# Patient Record
Sex: FEMALE | Race: BLACK OR AFRICAN AMERICAN | NOT HISPANIC OR LATINO
[De-identification: names, ages, dates, MRNs, and addresses within clinical notes are randomized per-mention and may not be internally consistent; named-entity substitution may affect disease eponyms.]

---

## 2017-03-21 ENCOUNTER — MEDICATION RENEWAL (OUTPATIENT)
Age: 47
End: 2017-03-21

## 2017-03-31 ENCOUNTER — RX RENEWAL (OUTPATIENT)
Age: 47
End: 2017-03-31

## 2017-04-21 ENCOUNTER — APPOINTMENT (OUTPATIENT)
Dept: RHEUMATOLOGY | Facility: CLINIC | Age: 47
End: 2017-04-21

## 2017-04-21 ENCOUNTER — LABORATORY RESULT (OUTPATIENT)
Age: 47
End: 2017-04-21

## 2017-04-21 VITALS
WEIGHT: 191 LBS | DIASTOLIC BLOOD PRESSURE: 86 MMHG | OXYGEN SATURATION: 98 % | HEIGHT: 62 IN | HEART RATE: 78 BPM | SYSTOLIC BLOOD PRESSURE: 129 MMHG | BODY MASS INDEX: 35.15 KG/M2

## 2017-04-23 LAB
25(OH)D3 SERPL-MCNC: 10.7 NG/ML
ALBUMIN SERPL ELPH-MCNC: 3.5 G/DL
ALP BLD-CCNC: 86 U/L
ALT SERPL-CCNC: 20 U/L
ANION GAP SERPL CALC-SCNC: 16 MMOL/L
APPEARANCE: CLEAR
AST SERPL-CCNC: 22 U/L
BACTERIA: NEGATIVE
BASOPHILS # BLD AUTO: 0.06 K/UL
BASOPHILS NFR BLD AUTO: 0.9 %
BILIRUB SERPL-MCNC: 0.2 MG/DL
BILIRUBIN URINE: NEGATIVE
BLOOD URINE: NEGATIVE
BUN SERPL-MCNC: 11 MG/DL
C3 SERPL-MCNC: 151 MG/DL
C4 SERPL-MCNC: 28 MG/DL
CALCIUM SERPL-MCNC: 8.6 MG/DL
CCP AB SER IA-ACNC: <8 UNITS
CHLORIDE SERPL-SCNC: 104 MMOL/L
CK SERPL-CCNC: 60 U/L
CO2 SERPL-SCNC: 20 MMOL/L
COLOR: YELLOW
CREAT SERPL-MCNC: 0.77 MG/DL
DSDNA AB SER-ACNC: <12 IU/ML
ENA RNP AB SER IA-ACNC: 0.2 AL
ENA SM AB SER IA-ACNC: <0.2 AL
ENA SS-A AB SER IA-ACNC: >8 AL
ENA SS-B AB SER IA-ACNC: 0.2 AL
EOSINOPHIL # BLD AUTO: 0.06 K/UL
EOSINOPHIL NFR BLD AUTO: 0.9 %
GLUCOSE QUALITATIVE U: NORMAL MG/DL
GLUCOSE SERPL-MCNC: 83 MG/DL
HCT VFR BLD CALC: 32.1 %
HGB BLD-MCNC: 9.7 G/DL
HIV1+2 AB SPEC QL IA.RAPID: NONREACTIVE
HYALINE CASTS: 1 /LPF
KETONES URINE: NEGATIVE
LEUKOCYTE ESTERASE URINE: NEGATIVE
LYMPHOCYTES # BLD AUTO: 1.11 K/UL
LYMPHOCYTES NFR BLD AUTO: 17.3 %
MAN DIFF?: NORMAL
MCHC RBC-ENTMCNC: 22.4 PG
MCHC RBC-ENTMCNC: 30.2 GM/DL
MCV RBC AUTO: 74 FL
MICROSCOPIC-UA: NORMAL
MONOCYTES # BLD AUTO: 0.64 K/UL
MONOCYTES NFR BLD AUTO: 10 %
NEUTROPHILS # BLD AUTO: 4.57 K/UL
NEUTROPHILS NFR BLD AUTO: 70.9 %
NITRITE URINE: NEGATIVE
PH URINE: 6.5
PLATELET # BLD AUTO: 364 K/UL
POTASSIUM SERPL-SCNC: 4.8 MMOL/L
PROT SERPL-MCNC: 7.8 G/DL
PROTEIN URINE: 100 MG/DL
RBC # BLD: 4.34 M/UL
RBC # FLD: 18.6 %
RED BLOOD CELLS URINE: 10 /HPF
RF+CCP IGG SER-IMP: NEGATIVE
RHEUMATOID FACT SER QL: >650 IU/ML
SODIUM SERPL-SCNC: 140 MMOL/L
SPECIFIC GRAVITY URINE: 1.03
SQUAMOUS EPITHELIAL CELLS: 3 /HPF
TSH SERPL-ACNC: 2.01 UIU/ML
UROBILINOGEN URINE: 1 MG/DL
WBC # FLD AUTO: 6.44 K/UL
WHITE BLOOD CELLS URINE: 2 /HPF

## 2018-01-23 ENCOUNTER — APPOINTMENT (OUTPATIENT)
Dept: RHEUMATOLOGY | Facility: CLINIC | Age: 48
End: 2018-01-23
Payer: COMMERCIAL

## 2018-01-23 ENCOUNTER — LABORATORY RESULT (OUTPATIENT)
Age: 48
End: 2018-01-23

## 2018-01-23 VITALS
HEART RATE: 80 BPM | RESPIRATION RATE: 16 BRPM | SYSTOLIC BLOOD PRESSURE: 116 MMHG | OXYGEN SATURATION: 96 % | BODY MASS INDEX: 35.15 KG/M2 | WEIGHT: 191 LBS | DIASTOLIC BLOOD PRESSURE: 79 MMHG | TEMPERATURE: 98 F | HEIGHT: 62 IN

## 2018-01-23 LAB
25(OH)D3 SERPL-MCNC: 12.3 NG/ML
ALBUMIN SERPL ELPH-MCNC: 3.7 G/DL
ALP BLD-CCNC: 83 U/L
ALT SERPL-CCNC: 23 U/L
ANION GAP SERPL CALC-SCNC: 14 MMOL/L
APPEARANCE: CLEAR
APTT BLD: 29.1 SEC
AST SERPL-CCNC: 25 U/L
BACTERIA: ABNORMAL
BILIRUB SERPL-MCNC: 0.3 MG/DL
BILIRUBIN URINE: NEGATIVE
BLOOD URINE: ABNORMAL
BUN SERPL-MCNC: 13 MG/DL
CALCIUM SERPL-MCNC: 8.8 MG/DL
CHLORIDE SERPL-SCNC: 103 MMOL/L
CK SERPL-CCNC: 87 U/L
CO2 SERPL-SCNC: 23 MMOL/L
COLOR: YELLOW
CREAT SERPL-MCNC: 0.69 MG/DL
CREAT SPEC-SCNC: 138 MG/DL
CREAT/PROT UR: 0.3 RATIO
GLUCOSE QUALITATIVE U: NEGATIVE MG/DL
GLUCOSE SERPL-MCNC: 89 MG/DL
HYALINE CASTS: 1 /LPF
INR PPP: 0.97 RATIO
IRON SATN MFR SERPL: 8 %
IRON SERPL-MCNC: 27 UG/DL
KETONES URINE: ABNORMAL
LEUKOCYTE ESTERASE URINE: NEGATIVE
MICROSCOPIC-UA: NORMAL
NITRITE URINE: NEGATIVE
PH URINE: 6
POTASSIUM SERPL-SCNC: 3.9 MMOL/L
PROT SERPL-MCNC: 8.2 G/DL
PROT UR-MCNC: 38 MG/DL
PROTEIN URINE: 30 MG/DL
PT BLD: 11 SEC
RED BLOOD CELLS URINE: 2 /HPF
SODIUM SERPL-SCNC: 140 MMOL/L
SPECIFIC GRAVITY URINE: 1.02
SQUAMOUS EPITHELIAL CELLS: 7 /HPF
TIBC SERPL-MCNC: 358 UG/DL
TSH SERPL-ACNC: 2.21 UIU/ML
UIBC SERPL-MCNC: 331 UG/DL
UROBILINOGEN URINE: NEGATIVE MG/DL
WHITE BLOOD CELLS URINE: 3 /HPF

## 2018-01-23 PROCEDURE — 99214 OFFICE O/P EST MOD 30 MIN: CPT

## 2018-01-24 LAB
B2 GLYCOPROT1 AB SER QL: POSITIVE
BASOPHILS # BLD AUTO: 0.06 K/UL
BASOPHILS NFR BLD AUTO: 0.9 %
C3 SERPL-MCNC: 141 MG/DL
C4 SERPL-MCNC: 26 MG/DL
CARDIOLIPIN AB SER IA-ACNC: NEGATIVE
DSDNA AB SER-ACNC: <12 IU/ML
ENA RNP AB SER IA-ACNC: 0.2 AL
ENA SM AB SER IA-ACNC: <0.2 AL
ENA SS-A AB SER IA-ACNC: >8 AL
ENA SS-B AB SER IA-ACNC: 0.4 AL
EOSINOPHIL # BLD AUTO: 0.32 K/UL
EOSINOPHIL NFR BLD AUTO: 4.5
HCT VFR BLD CALC: 30.8 %
HGB BLD-MCNC: 9.8 G/DL
LYMPHOCYTES # BLD AUTO: 0.89 K/UL
LYMPHOCYTES NFR BLD AUTO: 12.5 %
MAN DIFF?: NORMAL
MCHC RBC-ENTMCNC: 23.1 PG
MCHC RBC-ENTMCNC: 31.8 GM/DL
MCV RBC AUTO: 72.5 FL
MONOCYTES # BLD AUTO: 0.63 K/UL
MONOCYTES NFR BLD AUTO: 8.9 %
NEUTROPHILS # BLD AUTO: 5.08 K/UL
NEUTROPHILS NFR BLD AUTO: 71.4 %
PLATELET # BLD AUTO: 425 K/UL
RBC # BLD: 4.25 M/UL
RBC # FLD: 18.2 %
WBC # FLD AUTO: 7.11 K/UL

## 2018-01-26 LAB — B2 GLYCOPROT1 IGA SERPL IA-ACNC: <5 SAU

## 2018-02-07 ENCOUNTER — APPOINTMENT (OUTPATIENT)
Dept: OBGYN | Facility: CLINIC | Age: 48
End: 2018-02-07

## 2018-07-30 ENCOUNTER — LABORATORY RESULT (OUTPATIENT)
Age: 48
End: 2018-07-30

## 2018-07-30 ENCOUNTER — APPOINTMENT (OUTPATIENT)
Dept: RHEUMATOLOGY | Facility: CLINIC | Age: 48
End: 2018-07-30
Payer: COMMERCIAL

## 2018-07-30 VITALS
DIASTOLIC BLOOD PRESSURE: 78 MMHG | HEIGHT: 62 IN | TEMPERATURE: 98.2 F | SYSTOLIC BLOOD PRESSURE: 113 MMHG | HEART RATE: 81 BPM | WEIGHT: 189 LBS | OXYGEN SATURATION: 98 % | BODY MASS INDEX: 34.78 KG/M2

## 2018-07-30 LAB
25(OH)D3 SERPL-MCNC: 38.7 NG/ML
ALBUMIN SERPL ELPH-MCNC: 3.6 G/DL
ALP BLD-CCNC: 68 U/L
ALT SERPL-CCNC: 85 U/L
ANION GAP SERPL CALC-SCNC: 10 MMOL/L
APPEARANCE: CLEAR
AST SERPL-CCNC: 57 U/L
BACTERIA: NEGATIVE
BASOPHILS # BLD AUTO: 0.02 K/UL
BASOPHILS NFR BLD AUTO: 0.4 %
BILIRUB SERPL-MCNC: 0.3 MG/DL
BILIRUBIN URINE: NEGATIVE
BLOOD URINE: NEGATIVE
BUN SERPL-MCNC: 10 MG/DL
C3 SERPL-MCNC: 100 MG/DL
C4 SERPL-MCNC: 17 MG/DL
CALCIUM SERPL-MCNC: 8.8 MG/DL
CHLORIDE SERPL-SCNC: 104 MMOL/L
CHOLEST SERPL-MCNC: 139 MG/DL
CHOLEST/HDLC SERPL: 2.6 RATIO
CK SERPL-CCNC: 69 U/L
CO2 SERPL-SCNC: 25 MMOL/L
COLOR: YELLOW
CREAT SERPL-MCNC: 0.67 MG/DL
CREAT SPEC-SCNC: 108 MG/DL
CREAT/PROT UR: 0.1 RATIO
EOSINOPHIL # BLD AUTO: 0.1 K/UL
EOSINOPHIL NFR BLD AUTO: 1.9 %
GLUCOSE QUALITATIVE U: NEGATIVE MG/DL
GLUCOSE SERPL-MCNC: 69 MG/DL
HBA1C MFR BLD HPLC: 5.4 %
HCT VFR BLD CALC: 37 %
HDLC SERPL-MCNC: 53 MG/DL
HGB BLD-MCNC: 11.8 G/DL
HYALINE CASTS: 0 /LPF
IMM GRANULOCYTES NFR BLD AUTO: 0.2 %
IRON SATN MFR SERPL: 14 %
IRON SERPL-MCNC: 45 UG/DL
KETONES URINE: NEGATIVE
LDLC SERPL CALC-MCNC: 77 MG/DL
LEUKOCYTE ESTERASE URINE: NEGATIVE
LYMPHOCYTES # BLD AUTO: 1.52 K/UL
LYMPHOCYTES NFR BLD AUTO: 29.2 %
MAN DIFF?: NORMAL
MCHC RBC-ENTMCNC: 25.8 PG
MCHC RBC-ENTMCNC: 31.9 GM/DL
MCV RBC AUTO: 80.8 FL
MICROSCOPIC-UA: NORMAL
MONOCYTES # BLD AUTO: 0.32 K/UL
MONOCYTES NFR BLD AUTO: 6.1 %
NEUTROPHILS # BLD AUTO: 3.24 K/UL
NEUTROPHILS NFR BLD AUTO: 62.2 %
NITRITE URINE: NEGATIVE
PH URINE: 7.5
PLATELET # BLD AUTO: 294 K/UL
POTASSIUM SERPL-SCNC: 4.8 MMOL/L
PROT SERPL-MCNC: 8.3 G/DL
PROT UR-MCNC: 15 MG/DL
PROTEIN URINE: NEGATIVE MG/DL
RBC # BLD: 4.58 M/UL
RBC # FLD: 19.9 %
RED BLOOD CELLS URINE: 4 /HPF
SODIUM SERPL-SCNC: 139 MMOL/L
SPECIFIC GRAVITY URINE: 1.01
SQUAMOUS EPITHELIAL CELLS: 4 /HPF
TIBC SERPL-MCNC: 333 UG/DL
TRIGL SERPL-MCNC: 47 MG/DL
TSH SERPL-ACNC: 1.14 UIU/ML
UIBC SERPL-MCNC: 288 UG/DL
UROBILINOGEN URINE: NEGATIVE MG/DL
WBC # FLD AUTO: 5.21 K/UL
WHITE BLOOD CELLS URINE: 0 /HPF

## 2018-07-30 PROCEDURE — 99214 OFFICE O/P EST MOD 30 MIN: CPT

## 2018-08-06 LAB — SSDNA AB SER-ACNC: 45 EU

## 2019-02-12 ENCOUNTER — OUTPATIENT (OUTPATIENT)
Dept: OUTPATIENT SERVICES | Facility: HOSPITAL | Age: 49
LOS: 1 days | End: 2019-02-12
Payer: SUBSIDIZED

## 2019-02-12 DIAGNOSIS — Z00.6 ENCOUNTER FOR EXAMINATION FOR NORMAL COMPARISON AND CONTROL IN CLINICAL RESEARCH PROGRAM: ICD-10-CM

## 2019-02-12 DIAGNOSIS — M32.9 SYSTEMIC LUPUS ERYTHEMATOSUS, UNSPECIFIED: ICD-10-CM

## 2019-02-12 PROCEDURE — 93306 TTE W/DOPPLER COMPLETE: CPT | Mod: 26

## 2019-02-12 PROCEDURE — 75571 CT HRT W/O DYE W/CA TEST: CPT

## 2019-02-12 PROCEDURE — 75571 CT HRT W/O DYE W/CA TEST: CPT | Mod: 26

## 2019-02-12 PROCEDURE — 0399T: CPT

## 2019-02-12 PROCEDURE — 93306 TTE W/DOPPLER COMPLETE: CPT

## 2019-02-12 PROCEDURE — 93356 MYOCRD STRAIN IMG SPCKL TRCK: CPT

## 2019-02-19 ENCOUNTER — APPOINTMENT (OUTPATIENT)
Dept: RHEUMATOLOGY | Facility: CLINIC | Age: 49
End: 2019-02-19
Payer: COMMERCIAL

## 2019-02-19 VITALS
SYSTOLIC BLOOD PRESSURE: 122 MMHG | BODY MASS INDEX: 33.86 KG/M2 | HEIGHT: 62 IN | RESPIRATION RATE: 16 BRPM | HEART RATE: 80 BPM | OXYGEN SATURATION: 98 % | DIASTOLIC BLOOD PRESSURE: 82 MMHG | WEIGHT: 184 LBS

## 2019-02-19 DIAGNOSIS — I25.10 ATHEROSCLEROTIC HEART DISEASE OF NATIVE CORONARY ARTERY W/OUT ANGINA PECTORIS: ICD-10-CM

## 2019-02-19 DIAGNOSIS — I25.84 ATHEROSCLEROTIC HEART DISEASE OF NATIVE CORONARY ARTERY W/OUT ANGINA PECTORIS: ICD-10-CM

## 2019-02-19 PROCEDURE — 99214 OFFICE O/P EST MOD 30 MIN: CPT

## 2019-02-19 RX ORDER — IRON ASPGLY,PS/C/B12/FA/CA/SUC 150-25-1
50-100 CAPSULE ORAL
Qty: 180 | Refills: 3 | Status: ACTIVE | COMMUNITY
Start: 2019-02-19 | End: 1900-01-01

## 2019-05-16 ENCOUNTER — RX RENEWAL (OUTPATIENT)
Age: 49
End: 2019-05-16

## 2019-05-28 ENCOUNTER — EMERGENCY (EMERGENCY)
Facility: HOSPITAL | Age: 49
LOS: 1 days | Discharge: HOME | End: 2019-05-28
Attending: EMERGENCY MEDICINE
Payer: COMMERCIAL

## 2019-05-28 VITALS
SYSTOLIC BLOOD PRESSURE: 117 MMHG | DIASTOLIC BLOOD PRESSURE: 80 MMHG | TEMPERATURE: 99 F | HEART RATE: 70 BPM | RESPIRATION RATE: 17 BRPM | OXYGEN SATURATION: 98 %

## 2019-05-28 VITALS
OXYGEN SATURATION: 100 % | RESPIRATION RATE: 16 BRPM | DIASTOLIC BLOOD PRESSURE: 72 MMHG | TEMPERATURE: 99 F | WEIGHT: 195.11 LBS | SYSTOLIC BLOOD PRESSURE: 107 MMHG | HEIGHT: 62 IN | HEART RATE: 105 BPM

## 2019-05-28 LAB
ALBUMIN SERPL ELPH-MCNC: 3.3 G/DL — SIGNIFICANT CHANGE UP (ref 3.3–5)
ALP SERPL-CCNC: 62 U/L — SIGNIFICANT CHANGE UP (ref 40–120)
ALT FLD-CCNC: 28 U/L — SIGNIFICANT CHANGE UP (ref 10–45)
ANION GAP SERPL CALC-SCNC: 10 MMOL/L — SIGNIFICANT CHANGE UP (ref 5–17)
APPEARANCE UR: ABNORMAL
AST SERPL-CCNC: 36 U/L — SIGNIFICANT CHANGE UP (ref 10–40)
BACTERIA # UR AUTO: NEGATIVE — SIGNIFICANT CHANGE UP
BASOPHILS # BLD AUTO: 0.1 K/UL — SIGNIFICANT CHANGE UP (ref 0–0.2)
BASOPHILS NFR BLD AUTO: 0.3 % — SIGNIFICANT CHANGE UP (ref 0–2)
BILIRUB SERPL-MCNC: 0.7 MG/DL — SIGNIFICANT CHANGE UP (ref 0.2–1.2)
BILIRUB UR-MCNC: NEGATIVE — SIGNIFICANT CHANGE UP
BUN SERPL-MCNC: 9 MG/DL — SIGNIFICANT CHANGE UP (ref 7–23)
CALCIUM SERPL-MCNC: 8.8 MG/DL — SIGNIFICANT CHANGE UP (ref 8.4–10.5)
CHLORIDE SERPL-SCNC: 103 MMOL/L — SIGNIFICANT CHANGE UP (ref 96–108)
CO2 SERPL-SCNC: 20 MMOL/L — LOW (ref 22–31)
COLOR SPEC: YELLOW — SIGNIFICANT CHANGE UP
COMMENT - URINE: SIGNIFICANT CHANGE UP
CREAT SERPL-MCNC: 0.73 MG/DL — SIGNIFICANT CHANGE UP (ref 0.5–1.3)
DIFF PNL FLD: ABNORMAL
EOSINOPHIL # BLD AUTO: 0.1 K/UL — SIGNIFICANT CHANGE UP (ref 0–0.5)
EOSINOPHIL NFR BLD AUTO: 0.3 % — SIGNIFICANT CHANGE UP (ref 0–6)
EPI CELLS # UR: 12 — HIGH
GAS PNL BLDV: SIGNIFICANT CHANGE UP
GLUCOSE SERPL-MCNC: 117 MG/DL — HIGH (ref 70–99)
GLUCOSE UR QL: NEGATIVE — SIGNIFICANT CHANGE UP
HCG UR QL: NEGATIVE — SIGNIFICANT CHANGE UP
HCT VFR BLD CALC: 33.7 % — LOW (ref 34.5–45)
HGB BLD-MCNC: 11.3 G/DL — LOW (ref 11.5–15.5)
HYALINE CASTS # UR AUTO: 3 /LPF — SIGNIFICANT CHANGE UP (ref 0–7)
KETONES UR-MCNC: SIGNIFICANT CHANGE UP
LEUKOCYTE ESTERASE UR-ACNC: NEGATIVE — SIGNIFICANT CHANGE UP
LYMPHOCYTES # BLD AUTO: 1.5 K/UL — SIGNIFICANT CHANGE UP (ref 1–3.3)
LYMPHOCYTES # BLD AUTO: 7.3 % — LOW (ref 13–44)
MCHC RBC-ENTMCNC: 27.1 PG — SIGNIFICANT CHANGE UP (ref 27–34)
MCHC RBC-ENTMCNC: 33.4 GM/DL — SIGNIFICANT CHANGE UP (ref 32–36)
MCV RBC AUTO: 81.2 FL — SIGNIFICANT CHANGE UP (ref 80–100)
MONOCYTES # BLD AUTO: 1.4 K/UL — HIGH (ref 0–0.9)
MONOCYTES NFR BLD AUTO: 6.6 % — SIGNIFICANT CHANGE UP (ref 2–14)
NEUTROPHILS # BLD AUTO: 17.5 K/UL — HIGH (ref 1.8–7.4)
NEUTROPHILS NFR BLD AUTO: 85.4 % — HIGH (ref 43–77)
NITRITE UR-MCNC: NEGATIVE — SIGNIFICANT CHANGE UP
PH UR: 6 — SIGNIFICANT CHANGE UP (ref 5–8)
PLATELET # BLD AUTO: 214 K/UL — SIGNIFICANT CHANGE UP (ref 150–400)
POTASSIUM SERPL-MCNC: 4.4 MMOL/L — SIGNIFICANT CHANGE UP (ref 3.5–5.3)
POTASSIUM SERPL-SCNC: 4.4 MMOL/L — SIGNIFICANT CHANGE UP (ref 3.5–5.3)
PROT SERPL-MCNC: 8.3 G/DL — SIGNIFICANT CHANGE UP (ref 6–8.3)
PROT UR-MCNC: ABNORMAL
RBC # BLD: 4.15 M/UL — SIGNIFICANT CHANGE UP (ref 3.8–5.2)
RBC # FLD: 16.5 % — HIGH (ref 10.3–14.5)
RBC CASTS # UR COMP ASSIST: 6 /HPF — HIGH (ref 0–4)
SODIUM SERPL-SCNC: 133 MMOL/L — LOW (ref 135–145)
SP GR SPEC: 1.03 — HIGH (ref 1.01–1.02)
UROBILINOGEN FLD QL: NEGATIVE — SIGNIFICANT CHANGE UP
WBC # BLD: 20.5 K/UL — HIGH (ref 3.8–10.5)
WBC # FLD AUTO: 20.5 K/UL — HIGH (ref 3.8–10.5)
WBC UR QL: 15 /HPF — HIGH (ref 0–5)

## 2019-05-28 PROCEDURE — 85014 HEMATOCRIT: CPT

## 2019-05-28 PROCEDURE — 81001 URINALYSIS AUTO W/SCOPE: CPT

## 2019-05-28 PROCEDURE — 74177 CT ABD & PELVIS W/CONTRAST: CPT | Mod: 26

## 2019-05-28 PROCEDURE — 82330 ASSAY OF CALCIUM: CPT

## 2019-05-28 PROCEDURE — 85027 COMPLETE CBC AUTOMATED: CPT

## 2019-05-28 PROCEDURE — 84132 ASSAY OF SERUM POTASSIUM: CPT

## 2019-05-28 PROCEDURE — 82435 ASSAY OF BLOOD CHLORIDE: CPT

## 2019-05-28 PROCEDURE — 87086 URINE CULTURE/COLONY COUNT: CPT

## 2019-05-28 PROCEDURE — 83605 ASSAY OF LACTIC ACID: CPT

## 2019-05-28 PROCEDURE — 96374 THER/PROPH/DIAG INJ IV PUSH: CPT | Mod: XU

## 2019-05-28 PROCEDURE — 71046 X-RAY EXAM CHEST 2 VIEWS: CPT

## 2019-05-28 PROCEDURE — 71046 X-RAY EXAM CHEST 2 VIEWS: CPT | Mod: 26

## 2019-05-28 PROCEDURE — 82803 BLOOD GASES ANY COMBINATION: CPT

## 2019-05-28 PROCEDURE — 84295 ASSAY OF SERUM SODIUM: CPT

## 2019-05-28 PROCEDURE — 74177 CT ABD & PELVIS W/CONTRAST: CPT

## 2019-05-28 PROCEDURE — 99284 EMERGENCY DEPT VISIT MOD MDM: CPT

## 2019-05-28 PROCEDURE — 80053 COMPREHEN METABOLIC PANEL: CPT

## 2019-05-28 PROCEDURE — 99284 EMERGENCY DEPT VISIT MOD MDM: CPT | Mod: 25

## 2019-05-28 PROCEDURE — 81025 URINE PREGNANCY TEST: CPT

## 2019-05-28 PROCEDURE — 82947 ASSAY GLUCOSE BLOOD QUANT: CPT

## 2019-05-28 RX ORDER — KETOROLAC TROMETHAMINE 30 MG/ML
15 SYRINGE (ML) INJECTION ONCE
Refills: 0 | Status: DISCONTINUED | OUTPATIENT
Start: 2019-05-28 | End: 2019-05-28

## 2019-05-28 RX ORDER — ACETAMINOPHEN 500 MG
975 TABLET ORAL ONCE
Refills: 0 | Status: COMPLETED | OUTPATIENT
Start: 2019-05-28 | End: 2019-05-28

## 2019-05-28 RX ORDER — SODIUM CHLORIDE 9 MG/ML
1000 INJECTION INTRAMUSCULAR; INTRAVENOUS; SUBCUTANEOUS ONCE
Refills: 0 | Status: COMPLETED | OUTPATIENT
Start: 2019-05-28 | End: 2019-05-28

## 2019-05-28 RX ADMIN — SODIUM CHLORIDE 1000 MILLILITER(S): 9 INJECTION INTRAMUSCULAR; INTRAVENOUS; SUBCUTANEOUS at 16:15

## 2019-05-28 RX ADMIN — Medication 975 MILLIGRAM(S): at 16:17

## 2019-05-28 RX ADMIN — Medication 15 MILLIGRAM(S): at 16:17

## 2019-05-28 NOTE — ED PROVIDER NOTE - ATTENDING CONTRIBUTION TO CARE
Pt with lupus nephritis presents with L flank to LLQ ab pain with fever to 100.8F since yesterday, and cough.  Mild td L CVA and LLQ, clear lungs.

## 2019-05-28 NOTE — ED PROVIDER NOTE - OBJECTIVE STATEMENT
49 y/o F with PMHx of SLE (not medications), fibroids, and HTN on lisinopril, No PSHx presents c/o abdominal pain since yesterday. Pain is 6/10, constant, worsened slightly since yesterday, cramping, L flank and LUQ radiates to LLQ, a/w constipation. Pt is still passing stool and flatus but notes stool is smaller caliber and harder than usual x 2-3 days, pt takes iron supplements, denies melena hematochezia, or preceding diarrhea. LMP 3 weeks ago, pt notes spotting this week and is due for her menses at the end of the week, denies abnormal vaginal bleeding or DC. Pt denies CP, SOB, back pain, pleuritic pain, n/v, urinary sxs, strange/abnl foods, recent travel, recent abx use, h/o pyelo or kidney stones, trauma, recent illness, prior episodes. Works as a .

## 2019-05-28 NOTE — ED PROVIDER NOTE - CLINICAL SUMMARY MEDICAL DECISION MAKING FREE TEXT BOX
Dr. Mao Note: LLQ and L flank with cough, consider atypical pneumonia vs pyelonephritis vs diverticulitis

## 2019-05-28 NOTE — ED ADULT NURSE NOTE - OBJECTIVE STATEMENT
48 year old female A&OX4 PMHX of Fibroids, presents with left sided flank pain that  radiates to the left abdomen that started yesterday. Patient states that flank pain is associated with subjective fever of 101.0. +CVA tenderness and left sided abdominal pain to palpation on exam. Lung sounds are clear and equal bilaterally. Abdomen is soft but tender over left upper and lower quadrants. Denies nausea, vomiting, dizziness, weakness, chest pain, shortness of breath, chest pain, palpitations, hematuria, burning or difficulty urinating.

## 2019-05-28 NOTE — ED PROVIDER NOTE - PHYSICAL EXAMINATION
GEN: Pt in NAD, A&O x3.  PSYCH: Affect appropriate.  EYES: Sclera white w/o injection, PERRLA.   ENT: Head NCAT. Nose w/o deformity. No auricular TTP. MMM. Neck supple FROM.   RESP: No chest wall tenderness, CTA b/l, no wheezes, rales, or rhonchi.   CARDIAC: RRR, clear distinct S1, S2, no appreciable murmurs.  ABD: Abdomen soft, ttp LUQ, rebound or guarding. Mild L sided CVAT.  VASC: 2+ radial and dorsalis pedis pulses b/l. No edema or calf tenderness.  SKIN: No rashes on the trunk.  Adequate mobility and turgor.

## 2019-05-28 NOTE — ED PROVIDER NOTE - NSFOLLOWUPINSTRUCTIONS_ED_ALL_ED_FT
1) Follow-up with your primary care provider in 1-2 days.    2) Continue to take all medications as prescribed.    3) Rest and drink plenty of fluids. Pain can be managed with Acetaminophen (aka Tylenol) 1000mg (2 extra strength tablets) every 6 hours and/or ibuprofen (aka Motrin or Advil) 600mg (3 regular strength tablets) every 8 hours.    4) Return to the ER for any new or worsening symptoms, abnormal vaginal bleeding, discharge, new or worsening abdominal pain, vomiting, fever, not passing stool or gas, or if any other concerns.

## 2019-05-28 NOTE — ED PROVIDER NOTE - CARE PLAN
Principal Discharge DX:	Acute abdominal pain  Assessment and plan of treatment:	1) Follow-up with your primary care provider in 1-2 days.  2) Continue to take all medications as prescribed.  3) Rest and drink plenty of fluids. Pain can be managed with Acetaminophen (aka Tylenol) 1000mg (2 extra strength tablets) every 6 hours and/or ibuprofen (aka Motrin or Advil) 600mg (3 regular strength tablets) every 8 hours.  4) Return to the ER for any new or worsening symptoms, abnormal vaginal bleeding, discharge, new or worsening abdominal pain, vomiting, fever, not passing stool or gas, or if any other concerns.

## 2019-05-28 NOTE — ED PROVIDER NOTE - PROGRESS NOTE DETAILS
CTAP negative, pt feeling better. labs, images, and plan d/w pt. all questions answered. return precautions explained. pt ready and stable for DC. -Harjit New PA-C

## 2019-05-28 NOTE — ED ADULT NURSE NOTE - CAS EDN DISCHARGE INTERVENTIONS
IV discontinued, cath removed intact/Pt d/c home w/ written and verbal instructions. Pt verbalized understanding. IV d/c. No s&s of infection/infiltration. VSS. Pt states " I am ready to go home".

## 2019-05-29 LAB
CULTURE RESULTS: SIGNIFICANT CHANGE UP
SPECIMEN SOURCE: SIGNIFICANT CHANGE UP

## 2019-06-03 ENCOUNTER — APPOINTMENT (OUTPATIENT)
Dept: RHEUMATOLOGY | Facility: CLINIC | Age: 49
End: 2019-06-03
Payer: COMMERCIAL

## 2019-06-03 ENCOUNTER — LABORATORY RESULT (OUTPATIENT)
Age: 49
End: 2019-06-03

## 2019-06-03 VITALS
HEIGHT: 62 IN | OXYGEN SATURATION: 99 % | BODY MASS INDEX: 35.88 KG/M2 | DIASTOLIC BLOOD PRESSURE: 86 MMHG | SYSTOLIC BLOOD PRESSURE: 132 MMHG | HEART RATE: 71 BPM | TEMPERATURE: 99.9 F | WEIGHT: 195 LBS

## 2019-06-03 PROCEDURE — 99214 OFFICE O/P EST MOD 30 MIN: CPT

## 2019-06-04 LAB
ALBUMIN SERPL ELPH-MCNC: 3.3 G/DL
ALP BLD-CCNC: 69 U/L
ALT SERPL-CCNC: 17 U/L
ANION GAP SERPL CALC-SCNC: 12 MMOL/L
APPEARANCE: CLEAR
APTT BLD: 32 SEC
AST SERPL-CCNC: 15 U/L
BACTERIA: NEGATIVE
BASOPHILS # BLD AUTO: 0.04 K/UL
BASOPHILS NFR BLD AUTO: 0.5 %
BILIRUB SERPL-MCNC: 0.2 MG/DL
BILIRUBIN URINE: NEGATIVE
BLOOD URINE: ABNORMAL
BUN SERPL-MCNC: 9 MG/DL
C3 SERPL-MCNC: 124 MG/DL
C4 SERPL-MCNC: 16 MG/DL
CALCIUM SERPL-MCNC: 8.7 MG/DL
CHLORIDE SERPL-SCNC: 107 MMOL/L
CK SERPL-CCNC: 45 U/L
CO2 SERPL-SCNC: 22 MMOL/L
COLOR: YELLOW
CREAT SERPL-MCNC: 0.66 MG/DL
CREAT SPEC-SCNC: 190 MG/DL
CREAT/PROT UR: 0.3 RATIO
EOSINOPHIL # BLD AUTO: 0.21 K/UL
EOSINOPHIL NFR BLD AUTO: 2.5 %
GLUCOSE QUALITATIVE U: NEGATIVE
GLUCOSE SERPL-MCNC: 109 MG/DL
HCT VFR BLD CALC: 34.2 %
HGB BLD-MCNC: 10.4 G/DL
HYALINE CASTS: 3 /LPF
IMM GRANULOCYTES NFR BLD AUTO: 0.6 %
INR PPP: 0.98 RATIO
KETONES URINE: NEGATIVE
LEUKOCYTE ESTERASE URINE: NEGATIVE
LYMPHOCYTES # BLD AUTO: 2.15 K/UL
LYMPHOCYTES NFR BLD AUTO: 25.6 %
MAN DIFF?: NORMAL
MCHC RBC-ENTMCNC: 25.1 PG
MCHC RBC-ENTMCNC: 30.4 GM/DL
MCV RBC AUTO: 82.6 FL
MICROSCOPIC-UA: NORMAL
MONOCYTES # BLD AUTO: 0.65 K/UL
MONOCYTES NFR BLD AUTO: 7.7 %
NEUTROPHILS # BLD AUTO: 5.31 K/UL
NEUTROPHILS NFR BLD AUTO: 63.1 %
NITRITE URINE: NEGATIVE
PH URINE: 6
PLATELET # BLD AUTO: 405 K/UL
POTASSIUM SERPL-SCNC: 4.1 MMOL/L
PROT SERPL-MCNC: 7.7 G/DL
PROT UR-MCNC: 62 MG/DL
PROTEIN URINE: ABNORMAL
PT BLD: 11.2 SEC
RBC # BLD: 4.14 M/UL
RBC # FLD: 18 %
RED BLOOD CELLS URINE: 44 /HPF
SILICA CLOTTING TIME INTERPRETATION: NORMAL
SILICA CLOTTING TIME S/C: 0.93 RATIO
SODIUM SERPL-SCNC: 141 MMOL/L
SPECIFIC GRAVITY URINE: 1.02
SQUAMOUS EPITHELIAL CELLS: 9 /HPF
UROBILINOGEN URINE: NORMAL
WBC # FLD AUTO: 8.41 K/UL
WHITE BLOOD CELLS URINE: 16 /HPF

## 2019-06-05 LAB
B2 GLYCOPROT1 AB SER QL: POSITIVE
CARDIOLIPIN AB SER IA-ACNC: NEGATIVE
ENA RNP AB SER IA-ACNC: 0.2 AL
ENA SM AB SER IA-ACNC: <0.2 AL
ENA SS-A AB SER IA-ACNC: >8 AL
ENA SS-B AB SER IA-ACNC: 0.3 AL

## 2019-06-06 LAB
B2 GLYCOPROT1 IGA SERPL IA-ACNC: <5 SAU
DSDNA AB SER-ACNC: <12 IU/ML

## 2019-08-25 ENCOUNTER — RX RENEWAL (OUTPATIENT)
Age: 49
End: 2019-08-25

## 2019-11-10 ENCOUNTER — EMERGENCY (EMERGENCY)
Facility: HOSPITAL | Age: 49
LOS: 1 days | Discharge: HOME | End: 2019-11-10
Attending: EMERGENCY MEDICINE
Payer: COMMERCIAL

## 2019-11-10 VITALS
RESPIRATION RATE: 17 BRPM | DIASTOLIC BLOOD PRESSURE: 85 MMHG | OXYGEN SATURATION: 100 % | HEART RATE: 69 BPM | SYSTOLIC BLOOD PRESSURE: 126 MMHG | TEMPERATURE: 98 F

## 2019-11-10 VITALS
SYSTOLIC BLOOD PRESSURE: 121 MMHG | DIASTOLIC BLOOD PRESSURE: 80 MMHG | OXYGEN SATURATION: 98 % | HEIGHT: 63 IN | RESPIRATION RATE: 16 BRPM | HEART RATE: 92 BPM | WEIGHT: 190.04 LBS | TEMPERATURE: 98 F

## 2019-11-10 LAB
ALBUMIN SERPL ELPH-MCNC: 3.3 G/DL — SIGNIFICANT CHANGE UP (ref 3.3–5)
ALP SERPL-CCNC: 65 U/L — SIGNIFICANT CHANGE UP (ref 40–120)
ALT FLD-CCNC: 18 U/L — SIGNIFICANT CHANGE UP (ref 10–45)
ANION GAP SERPL CALC-SCNC: 11 MMOL/L — SIGNIFICANT CHANGE UP (ref 5–17)
APPEARANCE UR: CLEAR — SIGNIFICANT CHANGE UP
AST SERPL-CCNC: 40 U/L — SIGNIFICANT CHANGE UP (ref 10–40)
BACTERIA # UR AUTO: NEGATIVE — SIGNIFICANT CHANGE UP
BASOPHILS # BLD AUTO: 0.02 K/UL — SIGNIFICANT CHANGE UP (ref 0–0.2)
BASOPHILS NFR BLD AUTO: 0.3 % — SIGNIFICANT CHANGE UP (ref 0–2)
BILIRUB SERPL-MCNC: 0.3 MG/DL — SIGNIFICANT CHANGE UP (ref 0.2–1.2)
BILIRUB UR-MCNC: NEGATIVE — SIGNIFICANT CHANGE UP
BUN SERPL-MCNC: 14 MG/DL — SIGNIFICANT CHANGE UP (ref 7–23)
CALCIUM SERPL-MCNC: 9.1 MG/DL — SIGNIFICANT CHANGE UP (ref 8.4–10.5)
CHLORIDE SERPL-SCNC: 108 MMOL/L — SIGNIFICANT CHANGE UP (ref 96–108)
CO2 SERPL-SCNC: 21 MMOL/L — LOW (ref 22–31)
COLOR SPEC: YELLOW — SIGNIFICANT CHANGE UP
CREAT SERPL-MCNC: 0.76 MG/DL — SIGNIFICANT CHANGE UP (ref 0.5–1.3)
DIFF PNL FLD: ABNORMAL
EOSINOPHIL # BLD AUTO: 0.17 K/UL — SIGNIFICANT CHANGE UP (ref 0–0.5)
EOSINOPHIL NFR BLD AUTO: 2.5 % — SIGNIFICANT CHANGE UP (ref 0–6)
EPI CELLS # UR: 1 /HPF — SIGNIFICANT CHANGE UP
GLUCOSE SERPL-MCNC: 89 MG/DL — SIGNIFICANT CHANGE UP (ref 70–99)
GLUCOSE UR QL: NEGATIVE — SIGNIFICANT CHANGE UP
HCG UR QL: NEGATIVE — SIGNIFICANT CHANGE UP
HCT VFR BLD CALC: 33.9 % — LOW (ref 34.5–45)
HGB BLD-MCNC: 10.6 G/DL — LOW (ref 11.5–15.5)
HYALINE CASTS # UR AUTO: 0 /LPF — SIGNIFICANT CHANGE UP (ref 0–2)
IMM GRANULOCYTES NFR BLD AUTO: 0.1 % — SIGNIFICANT CHANGE UP (ref 0–1.5)
KETONES UR-MCNC: NEGATIVE — SIGNIFICANT CHANGE UP
LEUKOCYTE ESTERASE UR-ACNC: NEGATIVE — SIGNIFICANT CHANGE UP
LYMPHOCYTES # BLD AUTO: 1.37 K/UL — SIGNIFICANT CHANGE UP (ref 1–3.3)
LYMPHOCYTES # BLD AUTO: 20.1 % — SIGNIFICANT CHANGE UP (ref 13–44)
MCHC RBC-ENTMCNC: 25.4 PG — LOW (ref 27–34)
MCHC RBC-ENTMCNC: 31.3 GM/DL — LOW (ref 32–36)
MCV RBC AUTO: 81.1 FL — SIGNIFICANT CHANGE UP (ref 80–100)
MONOCYTES # BLD AUTO: 0.69 K/UL — SIGNIFICANT CHANGE UP (ref 0–0.9)
MONOCYTES NFR BLD AUTO: 10.1 % — SIGNIFICANT CHANGE UP (ref 2–14)
NEUTROPHILS # BLD AUTO: 4.56 K/UL — SIGNIFICANT CHANGE UP (ref 1.8–7.4)
NEUTROPHILS NFR BLD AUTO: 66.9 % — SIGNIFICANT CHANGE UP (ref 43–77)
NITRITE UR-MCNC: NEGATIVE — SIGNIFICANT CHANGE UP
NRBC # BLD: 0 /100 WBCS — SIGNIFICANT CHANGE UP (ref 0–0)
PH UR: 6 — SIGNIFICANT CHANGE UP (ref 5–8)
PLATELET # BLD AUTO: 294 K/UL — SIGNIFICANT CHANGE UP (ref 150–400)
POTASSIUM SERPL-MCNC: 5.3 MMOL/L — SIGNIFICANT CHANGE UP (ref 3.5–5.3)
POTASSIUM SERPL-SCNC: 5.3 MMOL/L — SIGNIFICANT CHANGE UP (ref 3.5–5.3)
PROT SERPL-MCNC: 7.7 G/DL — SIGNIFICANT CHANGE UP (ref 6–8.3)
PROT UR-MCNC: ABNORMAL
RBC # BLD: 4.18 M/UL — SIGNIFICANT CHANGE UP (ref 3.8–5.2)
RBC # FLD: 18.1 % — HIGH (ref 10.3–14.5)
RBC CASTS # UR COMP ASSIST: 326 /HPF — HIGH (ref 0–4)
SODIUM SERPL-SCNC: 140 MMOL/L — SIGNIFICANT CHANGE UP (ref 135–145)
SP GR SPEC: 1.03 — HIGH (ref 1.01–1.02)
UROBILINOGEN FLD QL: NEGATIVE — SIGNIFICANT CHANGE UP
WBC # BLD: 6.82 K/UL — SIGNIFICANT CHANGE UP (ref 3.8–10.5)
WBC # FLD AUTO: 6.82 K/UL — SIGNIFICANT CHANGE UP (ref 3.8–10.5)
WBC UR QL: 3 /HPF — SIGNIFICANT CHANGE UP (ref 0–5)

## 2019-11-10 PROCEDURE — 36415 COLL VENOUS BLD VENIPUNCTURE: CPT

## 2019-11-10 PROCEDURE — 81025 URINE PREGNANCY TEST: CPT

## 2019-11-10 PROCEDURE — 80053 COMPREHEN METABOLIC PANEL: CPT

## 2019-11-10 PROCEDURE — 85027 COMPLETE CBC AUTOMATED: CPT

## 2019-11-10 PROCEDURE — 81001 URINALYSIS AUTO W/SCOPE: CPT

## 2019-11-10 PROCEDURE — 99284 EMERGENCY DEPT VISIT MOD MDM: CPT

## 2019-11-10 NOTE — ED PROVIDER NOTE - NSFOLLOWUPINSTRUCTIONS_ED_ALL_ED_FT
1. Follow up with your doctor/ GYN at [address] [place] [address] [phone];   2. Take copy of your results to your next appointment.  3. Rest. Keep self well hydrated. Dark green leafy vegetables. Iron supplements with glass of orange juice.   4. Return if weakness, chest pain, short of breath, dark stools, dizzy or any other concerns. 1. Follow up with your doctor/ GYN at [address] [place] [address] [phone];   2. Take copy of your results to your next appointment.  3. Rest. Keep self well hydrated. Dark green leafy vegetables. Iron supplements with glass of orange juice.   4. Return if weakness, chest pain, short of breath, dark stools, dizzy or any other concerns.  You were seen for vaginal bleeding. Please return immediately if you are bleeding through more then 1 pad per hour have any abdominal pain, fevers or further concerns

## 2019-11-10 NOTE — ED PROVIDER NOTE - PATIENT PORTAL LINK FT
You can access the FollowMyHealth Patient Portal offered by [facility] by registering at the following website: [email or web address]. By joining [facility]’s FollowMyHealth portal, you will also be able to view your health information using other applications (apps) compatible with our system.

## 2019-11-10 NOTE — ED ADULT NURSE NOTE - OBJECTIVE STATEMENT
Female 49 years old with PMH of HTN and Lupus came in for vaginal bleeding. Pt is s/p Myomectomy last June 2019, had received Depo Provera injection once September to regulate her menstrual period. Two weeks after, patient had her period, stopped after one week. Pt states Oct 11 her period started again up to the present, some days she's passing blood clots and some days are light bleeding. Uses 2-3 vaginal pads/day. Denies dizziness, chest pain, sob, abdominal pain, nausea or vomiting, fever or chills. Labs obtained. Will continue to reassess.

## 2019-11-10 NOTE — ED PROVIDER NOTE - ATTENDING CONTRIBUTION TO CARE
Attending MD Annette Newton:   I personally have seen and examined this patient.  Physician assistant note reviewed and agree on plan of care and except where noted.  See HPI, PE, and MDM for details.

## 2019-11-10 NOTE — ED PROVIDER NOTE - OBJECTIVE STATEMENT
49yof with PMHx of SLE (not medications), fibroids, and HTN, hx of fibroids c/p myomectomy in June, found to have adenomyosis, started on Dep; had one dose, change in insurance so deciding to follow up with [facility]. reports since October with repeated bleeding, waxing and waning, max pads in one day about 4. No abd pain, no fever ro chills. No sob or chest pain, no palp, no syncope. 49yof with PMHx of SLE (not medications), fibroids, and HTN, hx of fibroids c/p myomectomy in June, found to have adenomyosis, started on Dep; had one dose, change in insurance so deciding to follow up with [facility]. reports since October with repeated bleeding, waxing and waning, max pads in one day about 4. No abd pain, no fever ro chills. No sob or chest pain, no palp, no syncope.  Attending Annette Newton: 48 y/o female h/o lupus, HTN, fibroids presenting with vaginal bleeding. pt states has been having vaginal bleeding for last month. intermittent in heaviness. pt states maximum of 3 pads per day. no fevers or chills. does intermittently have cramps but none currently. no fevers or chills. not on blood thinners. no dysuria. OB is at [facility]. pt has not been able to follow up due to insurance issues. also has a h/o of anemia. no sob or difficulty breathing

## 2019-11-10 NOTE — ED PROVIDER NOTE - PHYSICAL EXAMINATION
Gen: AAO x 3, NAD  Skin: No rashes or lesions  HEENT: NC/AT, PERRLA, EOMI, MMM  Resp: unlabored CTAB  Cardiac: rrr s1s2, no murmurs, rubs or gallops  GI: ND, +BS, Soft, NT  Ext: no pedal edema, FROM in all extremities  Neuro: no focal deficits Gen: AAO x 3, NAD  Skin: No rashes or lesions  HEENT: NC/AT, PERRLA, EOMI, MMM  Resp: unlabored CTAB  Cardiac: rrr s1s2, no murmurs, rubs or gallops  GI: ND, +BS, Soft, NT  Ext: no pedal edema, FROM in all extremities  Neuro: no focal deficits  Attending Annette Newton: Gen: NAD, heent: atrauamtic, eomi, perrla, mmm, op pink, uvula midline, neck; nttp, no nuchal rigidity, chest: nttp, no crepitus, cv: rrr, no murmurs, lungs: ctab, abd: soft, nontender, nondistended, no peritoneal signs, +BS, no guarding, ext: wwp, neg homans, skin: no rash, neuro: awake and alert, following commands, speech clear, sensation and strength intact, no focal deficits Gen: AAO x 3, NAD  Skin: No rashes or lesions  HEENT: NC/AT, PERRLA, EOMI, MMM  Resp: unlabored CTAB  Cardiac: rrr s1s2, no murmurs, rubs or gallops  GI: ND, +BS, Soft, NT  GYN: no external lesions, scant amount of blood in the vaginal vault. closed cervix. no clots   Ext: no pedal edema, FROM in all extremities  Neuro: no focal deficits  Attending Annette Newton: Gen: NAD, heent: atrauamtic, eomi, perrla, mmm, op pink, uvula midline, neck; nttp, no nuchal rigidity, chest: nttp, no crepitus, cv: rrr, no murmurs, lungs: ctab, abd: soft, nontender, nondistended, no peritoneal signs, +BS, no guarding, ext: wwp, neg homans, skin: no rash, neuro: awake and alert, following commands, speech clear, sensation and strength intact, no focal deficits

## 2019-11-10 NOTE — ED PROVIDER NOTE - CLINICAL SUMMARY MEDICAL DECISION MAKING FREE TEXT BOX
Attending Annette Newton: 48 y/o female presenting with vaginal bleeding for last month. upon arrival pt well appearing and hemodynamically stable. no evidence of active bleeding on exam. pt with h/o fibroids and prior myomectomy. abdomen soft on exam and nontender. blood work showed no evidence of severe anemia and pt asymptomatic. will need GYN follow up for management. no pain to suggest torsion. pt nontoxic appearing. will give bleeding precautions. no h/o hypercoagulability and not on blood thinners

## 2019-11-10 NOTE — ED ADULT TRIAGE NOTE - CHIEF COMPLAINT QUOTE
Myomectomy in June for heavy periods, found pt to have Adenomyosis, started Depo-provera shots in August to regulate periods, was helping minimally during September  Now with vaginal bleeding since 10/11  Denies lightheadedness, SOB, abdominal/pelvic pain

## 2019-11-10 NOTE — ED PROVIDER NOTE - NSFOLLOWUPCLINICS_GEN_ALL_ED_FT
[facility] Gynecology and Obstetrics  Gynceology/OB  [address] [place] [address]  Phone: [phone]  Fax:   Follow Up Time:

## 2019-12-01 ENCOUNTER — OUTPATIENT (OUTPATIENT)
Dept: OUTPATIENT SERVICES | Facility: HOSPITAL | Age: 49
LOS: 1 days | End: 2019-12-01
Payer: MEDICAID

## 2019-12-01 PROCEDURE — G9001: CPT

## 2019-12-10 ENCOUNTER — APPOINTMENT (OUTPATIENT)
Dept: RHEUMATOLOGY | Facility: CLINIC | Age: 49
End: 2019-12-10
Payer: MEDICAID

## 2019-12-10 VITALS
OXYGEN SATURATION: 98 % | HEIGHT: 62 IN | BODY MASS INDEX: 35.88 KG/M2 | WEIGHT: 195 LBS | HEART RATE: 79 BPM | DIASTOLIC BLOOD PRESSURE: 91 MMHG | SYSTOLIC BLOOD PRESSURE: 143 MMHG | RESPIRATION RATE: 16 BRPM

## 2019-12-10 DIAGNOSIS — L28.2 OTHER PRURIGO: ICD-10-CM

## 2019-12-10 PROCEDURE — 99214 OFFICE O/P EST MOD 30 MIN: CPT | Mod: 25

## 2019-12-10 PROCEDURE — 90674 CCIIV4 VAC NO PRSV 0.5 ML IM: CPT

## 2019-12-10 PROCEDURE — G0008: CPT

## 2019-12-11 LAB
25(OH)D3 SERPL-MCNC: 32.8 NG/ML
ALBUMIN SERPL ELPH-MCNC: 3.4 G/DL
ALP BLD-CCNC: 73 U/L
ALT SERPL-CCNC: 16 U/L
ANION GAP SERPL CALC-SCNC: 11 MMOL/L
APPEARANCE: CLEAR
AST SERPL-CCNC: 16 U/L
BACTERIA: NEGATIVE
BASOPHILS # BLD AUTO: 0.05 K/UL
BASOPHILS NFR BLD AUTO: 0.8 %
BILIRUB SERPL-MCNC: 0.3 MG/DL
BILIRUBIN URINE: NEGATIVE
BLOOD URINE: ABNORMAL
BUN SERPL-MCNC: 15 MG/DL
C3 SERPL-MCNC: 96 MG/DL
C4 SERPL-MCNC: 16 MG/DL
CALCIUM OXALATE CRYSTALS: ABNORMAL
CALCIUM SERPL-MCNC: 9 MG/DL
CHLORIDE SERPL-SCNC: 106 MMOL/L
CK SERPL-CCNC: 53 U/L
CO2 SERPL-SCNC: 21 MMOL/L
COLOR: NORMAL
CREAT SERPL-MCNC: 0.74 MG/DL
CREAT SPEC-SCNC: 113 MG/DL
CREAT/PROT UR: 0.4 RATIO
ENA RNP AB SER IA-ACNC: 0.2 AL
ENA SM AB SER IA-ACNC: <0.2 AL
ENA SS-A AB SER IA-ACNC: >8 AL
ENA SS-B AB SER IA-ACNC: 0.2 AL
EOSINOPHIL # BLD AUTO: 0.16 K/UL
EOSINOPHIL NFR BLD AUTO: 2.5 %
GLUCOSE QUALITATIVE U: NEGATIVE
GLUCOSE SERPL-MCNC: 103 MG/DL
HCT VFR BLD CALC: 36.3 %
HGB BLD-MCNC: 11.3 G/DL
HYALINE CASTS: 0 /LPF
IMM GRANULOCYTES NFR BLD AUTO: 0.3 %
KETONES URINE: NEGATIVE
LEUKOCYTE ESTERASE URINE: NEGATIVE
LYMPHOCYTES # BLD AUTO: 1.76 K/UL
LYMPHOCYTES NFR BLD AUTO: 27 %
MAN DIFF?: NORMAL
MCHC RBC-ENTMCNC: 25.7 PG
MCHC RBC-ENTMCNC: 31.1 GM/DL
MCV RBC AUTO: 82.7 FL
MICROSCOPIC-UA: NORMAL
MONOCYTES # BLD AUTO: 0.72 K/UL
MONOCYTES NFR BLD AUTO: 11 %
NEUTROPHILS # BLD AUTO: 3.81 K/UL
NEUTROPHILS NFR BLD AUTO: 58.4 %
NITRITE URINE: NEGATIVE
PH URINE: 6.5
PLATELET # BLD AUTO: 284 K/UL
POTASSIUM SERPL-SCNC: 4.5 MMOL/L
PROT SERPL-MCNC: 7.3 G/DL
PROT UR-MCNC: 48 MG/DL
PROTEIN URINE: ABNORMAL
RBC # BLD: 4.39 M/UL
RBC # FLD: 16.5 %
RED BLOOD CELLS URINE: 61 /HPF
SODIUM SERPL-SCNC: 138 MMOL/L
SPECIFIC GRAVITY URINE: 1.02
SQUAMOUS EPITHELIAL CELLS: 4 /HPF
UROBILINOGEN URINE: NORMAL
WBC # FLD AUTO: 6.52 K/UL
WHITE BLOOD CELLS URINE: 2 /HPF

## 2019-12-12 DIAGNOSIS — Z71.89 OTHER SPECIFIED COUNSELING: ICD-10-CM

## 2019-12-14 LAB — DSDNA AB SER-ACNC: <12 IU/ML

## 2020-04-06 NOTE — ED ADULT NURSE NOTE - RESPIRATORY WDL
Breathing spontaneous and unlabored. Breath sounds clear and equal bilaterally with regular rhythm.
None

## 2020-07-07 ENCOUNTER — APPOINTMENT (OUTPATIENT)
Dept: RHEUMATOLOGY | Facility: CLINIC | Age: 50
End: 2020-07-07

## 2020-07-21 ENCOUNTER — APPOINTMENT (OUTPATIENT)
Dept: RHEUMATOLOGY | Facility: CLINIC | Age: 50
End: 2020-07-21
Payer: MEDICAID

## 2020-07-21 DIAGNOSIS — D50.9 IRON DEFICIENCY ANEMIA, UNSPECIFIED: ICD-10-CM

## 2020-07-21 PROCEDURE — G0009: CPT

## 2020-07-21 PROCEDURE — 99214 OFFICE O/P EST MOD 30 MIN: CPT | Mod: 25

## 2020-07-21 PROCEDURE — 90732 PPSV23 VACC 2 YRS+ SUBQ/IM: CPT

## 2020-07-22 VITALS — RESPIRATION RATE: 15 BRPM | DIASTOLIC BLOOD PRESSURE: 78 MMHG | HEART RATE: 84 BPM | SYSTOLIC BLOOD PRESSURE: 115 MMHG

## 2020-07-22 LAB
25(OH)D3 SERPL-MCNC: 39.7 NG/ML
ALBUMIN SERPL ELPH-MCNC: 4.1 G/DL
ALP BLD-CCNC: 61 U/L
ALT SERPL-CCNC: 29 U/L
ANION GAP SERPL CALC-SCNC: 14 MMOL/L
APPEARANCE: CLEAR
AST SERPL-CCNC: 18 U/L
BACTERIA: NEGATIVE
BASOPHILS # BLD AUTO: 0.05 K/UL
BASOPHILS NFR BLD AUTO: 0.7 %
BILIRUB SERPL-MCNC: 0.4 MG/DL
BILIRUBIN URINE: NEGATIVE
BLOOD URINE: NORMAL
BUN SERPL-MCNC: 14 MG/DL
C3 SERPL-MCNC: 100 MG/DL
C4 SERPL-MCNC: 24 MG/DL
CALCIUM SERPL-MCNC: 9 MG/DL
CHLORIDE SERPL-SCNC: 104 MMOL/L
CK SERPL-CCNC: 66 U/L
CO2 SERPL-SCNC: 22 MMOL/L
COLOR: YELLOW
CREAT SERPL-MCNC: 0.86 MG/DL
CREAT SPEC-SCNC: 283 MG/DL
CREAT/PROT UR: 0.2 RATIO
DSDNA AB SER-ACNC: <12 IU/ML
EOSINOPHIL # BLD AUTO: 0.21 K/UL
EOSINOPHIL NFR BLD AUTO: 2.7 %
GLUCOSE QUALITATIVE U: NEGATIVE
GLUCOSE SERPL-MCNC: 76 MG/DL
HCT VFR BLD CALC: 45.5 %
HGB BLD-MCNC: 14.1 G/DL
HYALINE CASTS: 1 /LPF
IMM GRANULOCYTES NFR BLD AUTO: 0.3 %
KETONES URINE: NEGATIVE
LEUKOCYTE ESTERASE URINE: NEGATIVE
LYMPHOCYTES # BLD AUTO: 1.82 K/UL
LYMPHOCYTES NFR BLD AUTO: 23.8 %
MAN DIFF?: NORMAL
MCHC RBC-ENTMCNC: 25.9 PG
MCHC RBC-ENTMCNC: 31 GM/DL
MCV RBC AUTO: 83.5 FL
MICROSCOPIC-UA: NORMAL
MONOCYTES # BLD AUTO: 0.84 K/UL
MONOCYTES NFR BLD AUTO: 11 %
NEUTROPHILS # BLD AUTO: 4.7 K/UL
NEUTROPHILS NFR BLD AUTO: 61.5 %
NITRITE URINE: NEGATIVE
PH URINE: 6.5
PLATELET # BLD AUTO: 313 K/UL
POTASSIUM SERPL-SCNC: 4.5 MMOL/L
PROT SERPL-MCNC: 7.7 G/DL
PROT UR-MCNC: 62 MG/DL
PROTEIN URINE: ABNORMAL
RBC # BLD: 5.45 M/UL
RBC # FLD: 17.3 %
RED BLOOD CELLS URINE: 5 /HPF
SODIUM SERPL-SCNC: 140 MMOL/L
SPECIFIC GRAVITY URINE: 1.03
SQUAMOUS EPITHELIAL CELLS: 2 /HPF
UROBILINOGEN URINE: NORMAL
WBC # FLD AUTO: 7.64 K/UL
WHITE BLOOD CELLS URINE: 7 /HPF

## 2020-07-22 NOTE — HISTORY OF PRESENT ILLNESS
[FreeTextEntry1] : 1.  SLE:  has generally been quiet without fever, arthritis, alopecia, oral ulcers. \par 2.  Lupus nephritis:  generally stable\par 3.  Hypertension:  had an episode of high BP on enalapril (but wasn't taking it regularly).  Her PCP put her on Exforge, but she stopped that as well.  She did not want to take any medicines.  Her BP at home had been a little high with diastolics around 90.  Therefore, she was started on Hyzaar 50 mg with better but not optimal control. The dose was then increased to 100 mg/d\par 4.  Weight loss:  has had a significant purposeful weight loss but the weight has come back on.\par 5.  Cough: may have been from ACE inhibitor as the symptoms went away off enalapril-but none on losartan.\par 6.  Anemia: Iron deficiency; she was started on two iron per day, and her GYN doctor suggested three per day.  However, she took them with food because of stomach upset.  Although the anemia corrected, it returned in early 2019.  Iron was to be restarted. \par 7.  Fibroids:  she saw GYN who suggested surgery, but until then Depo-Provera was prescribed.  A D&C was performed, and this helped reduce the bleeding.  Surgery is to be performed in June 17, 2019.\par 8.  Myalgias: single episode of bilateral thigh myalgias in the late spring 2016.  They disappeared spontaneously. \par 9.  Vit D deficiency\par 10.  Coronary calcification:  underwent a CT and noted to have mild coronary calcification. She was referred to a cardiologist. \par 11.  ILD: she was noted to have ground glass changes on the Calcium CT. She has had mild asthma in the past but no dyspnea in 2019. \par 12.  Rash: pruritic rash behind right LE that started in the fall 2019.  No evaluation to date.  Rashes continued on an intermittent basis through 2020.\par 13.  Anxiety: over COVID\par \par Meds\par Hyzaar 100/25 mg/d\par Iron 3 tabs per day stopped\par \par Vaccines\par Prevnar 13 4/14/2015 ([id number]; exp 8/16)\par PNVX 10/11/2010\par PNVX 23 7/21/20  ([id number]; exp 8/23/21)\par Fluzone Quadrivalent 10/21/2014 ( AD; exp 6/30/15)

## 2020-07-22 NOTE — PHYSICAL EXAM
[General Appearance - Alert] : alert [General Appearance - In No Acute Distress] : in no acute distress [General Appearance - Well-Appearing] : healthy appearing [General Appearance - Well Developed] : well developed [General Appearance - Well Nourished] : well nourished [Outer Ear] : the ears and nose were normal in appearance [Sclera] : the sclera and conjunctiva were normal [Extraocular Movements] : extraocular movements were intact [Examination Of The Oral Cavity] : the lips and gums were normal [Neck Appearance] : the appearance of the neck was normal [Nasal Cavity] : the nasal mucosa and septum were normal [Oropharynx] : the oropharynx was normal [Thyroid Diffuse Enlargement] : the thyroid was not enlarged [Neck Cervical Mass (___cm)] : no neck mass was observed [Jugular Venous Distention Increased] : there was no jugular-venous distention [Respiration, Rhythm And Depth] : normal respiratory rhythm and effort [Thyroid Nodule] : there were no palpable thyroid nodules [Exaggerated Use Of Accessory Muscles For Inspiration] : no accessory muscle use [Heart Rate And Rhythm] : heart rate was normal and rhythm regular [Auscultation Breath Sounds / Voice Sounds] : lungs were clear to auscultation bilaterally [Heart Sounds Gallop] : no gallops [Heart Sounds] : normal S1 and S2 [Heart Sounds Pericardial Friction Rub] : no pericardial rub [Murmurs] : no murmurs [Arterial Pulses Carotid] : carotid pulses were normal with no bruits [Veins - Varicosity Changes] : there were no varicosital changes [Edema] : there was no peripheral edema [Bowel Sounds] : normal bowel sounds [Abdomen Tenderness] : non-tender [Abdomen Soft] : soft [Cervical Lymph Nodes Enlarged Posterior Bilaterally] : posterior cervical [Cervical Lymph Nodes Enlarged Anterior Bilaterally] : anterior cervical [Abdomen Mass (___ Cm)] : no abdominal mass palpated [No CVA Tenderness] : no ~M costovertebral angle tenderness [No Spinal Tenderness] : no spinal tenderness [Supraclavicular Lymph Nodes Enlarged Bilaterally] : supraclavicular [Nail Clubbing] : no clubbing  or cyanosis of the fingernails [Abnormal Walk] : normal gait [Motor Tone] : muscle strength and tone were normal [Musculoskeletal - Swelling] : no joint swelling seen [Skin Turgor] : normal skin turgor [Skin Color & Pigmentation] : normal skin color and pigmentation [] : no rash [Sensation] : the sensory exam was normal to light touch and pinprick [Motor Exam] : the motor exam was normal [Oriented To Time, Place, And Person] : oriented to person, place, and time [Affect] : the affect was normal [Impaired Insight] : insight and judgment were intact

## 2020-07-22 NOTE — DISCUSSION/SUMMARY
[FreeTextEntry1] : 1.  SLE:  has generally been quiet without fever, arthritis, alopecia, oral ulcers. \par 2.  Lupus nephritis:  generally stable\par 3.  Hypertension:  had an episode of high BP on enalapril (but wasn't taking it regularly).  Her PCP put her on Exforge, but she stopped that as well.  She did not want to take any medicines.  Her BP at home had been a little high with diastolics around 90.  Therefore, she was started on Hyzaar 50 mg with better control. \par 4.  Weight loss:  has had a significant purposeful weight loss but the weight has come back on.\par 5.  Cough: may have been from ACE inhibitor as the symptoms went away off enalapril-but none on losartan.\par 6.  Anemia: Iron deficiency; she was started on two iron per day, and her GYN doctor suggested three per day.  However, she is taking them with food because of stomach upset.\par 7.  Fibroids:  she saw GYN who suggested surgery, but until then Depo-Provera was prescribed.  A D&C was performed, and this helped reduce the bleeding.  Surgery is to be performed in the spring/summer 2015. \par \par Plan:\par 1.  Lab tests\par 2.  Continue Hyzaar 50 mg/d\par 3.  Continue iron sulfate 3xd but between meals\par 4.  Return one month\par 5.  If iron values remain low, consider parenteral iron.\par 6.  Prevnar given\par 7.  Will require pre-op clearance

## 2020-07-22 NOTE — ASSESSMENT
[FreeTextEntry1] : 1.  SLE:  has generally been quiet without fever, arthritis, alopecia, oral ulcers. \par 2.  Lupus nephritis:  generally stable\par 3.  Hypertension:  had an episode of high BP on enalapril (but wasn't taking it regularly).  Her PCP put her on Exforge, but she stopped that as well.  She did not want to take any medicines.  Her BP at home had been a little high with diastolics around 90.  Therefore, she was started on Hyzaar 50 mg with better but not optimal control. The dose was then increased to 100 mg/d\par 4.  Weight loss:  has had a significant purposeful weight loss but the weight has come back on.\par 5.  Cough: may have been from ACE inhibitor as the symptoms went away off enalapril-but none on losartan.\par 6.  Anemia: Iron deficiency; she was started on two iron per day, and her GYN doctor suggested three per day.  However, she took them with food because of stomach upset.  Although the anemia corrected, it returned in early 2019.  Iron was to be restarted. \par 7.  Fibroids:  she saw GYN who suggested surgery, but until then Depo-Provera was prescribed.  A D&C was performed, and this helped reduce the bleeding.  Surgery is to be performed in June 17, 2019.\par 8.  Myalgias: single episode of bilateral thigh myalgias in the late spring 2016.  They disappeared spontaneously. \par 9.  Vit D deficiency\par 10.  Coronary calcification:  underwent a CT and noted to have mild coronary calcification. She was referred to a cardiologist. \par 11.  ILD: she was noted to have ground glass changes on the Calcium CT. She has had mild asthma in the past but no dyspnea in 2019. \par 12.  Rash: pruritic rash behind right LE that started in the fall 2019.  No evaluation to date.  Rashes continued on an intermittent basis through 2020.\par 13.  Anxiety: over COVID\par \par Plan:\par 1.  Lab tests\par 2.  Continue Hyzaar 100/25 mg 1xd\par 3.  Return 4 months if labs OK\par 4.  To perform BP checks at home, with PCP, or at work\par 5.  Take iron!!\par 7.  F/U cardiology\par 8.  F/U pulmonary\par 9.  PNVX 23 subcu RUE\par 10. Flu shot IM left deltoid

## 2020-07-22 NOTE — REVIEW OF SYSTEMS
[As Noted in HPI] : as noted in HPI [Negative] : Neurological [Chills] : no chills [Fever] : no fever [Feeling Tired] : not feeling tired [Feeling Poorly] : not feeling poorly

## 2020-10-14 ENCOUNTER — RX RENEWAL (OUTPATIENT)
Age: 50
End: 2020-10-14

## 2020-10-14 DIAGNOSIS — J30.2 OTHER SEASONAL ALLERGIC RHINITIS: ICD-10-CM

## 2020-10-14 RX ORDER — LORATADINE 10 MG/1
10 TABLET ORAL
Qty: 30 | Refills: 3 | Status: ACTIVE | COMMUNITY
Start: 2020-10-14 | End: 1900-01-01

## 2021-07-20 ENCOUNTER — APPOINTMENT (OUTPATIENT)
Dept: RHEUMATOLOGY | Facility: CLINIC | Age: 51
End: 2021-07-20

## 2021-08-20 NOTE — ED PROVIDER NOTE - NS HIV RISK FACTOR YES
Spoke with [facility] to get update on admission. Reports they were busy with a pt and getting report on a transfer. [facility] to call [facility].    Electronically signed by PAVEL Bautista, Intake RN   Unable to consent due to medical condition

## 2021-11-17 ENCOUNTER — RX RENEWAL (OUTPATIENT)
Age: 51
End: 2021-11-17

## 2021-11-19 ENCOUNTER — RESULT REVIEW (OUTPATIENT)
Age: 51
End: 2021-11-19

## 2022-01-16 ENCOUNTER — RX RENEWAL (OUTPATIENT)
Age: 52
End: 2022-01-16

## 2022-02-07 ENCOUNTER — TRANSCRIPTION ENCOUNTER (OUTPATIENT)
Age: 52
End: 2022-02-07

## 2022-04-05 ENCOUNTER — APPOINTMENT (OUTPATIENT)
Dept: RHEUMATOLOGY | Facility: CLINIC | Age: 52
End: 2022-04-05

## 2022-04-19 ENCOUNTER — APPOINTMENT (OUTPATIENT)
Dept: RHEUMATOLOGY | Facility: CLINIC | Age: 52
End: 2022-04-19

## 2022-05-01 ENCOUNTER — RX RENEWAL (OUTPATIENT)
Age: 52
End: 2022-05-01

## 2022-05-10 ENCOUNTER — APPOINTMENT (OUTPATIENT)
Dept: RHEUMATOLOGY | Facility: CLINIC | Age: 52
End: 2022-05-10
Payer: COMMERCIAL

## 2022-05-10 VITALS
BODY MASS INDEX: 39.56 KG/M2 | HEART RATE: 91 BPM | TEMPERATURE: 97.9 F | OXYGEN SATURATION: 98 % | SYSTOLIC BLOOD PRESSURE: 138 MMHG | HEIGHT: 62 IN | DIASTOLIC BLOOD PRESSURE: 90 MMHG | WEIGHT: 215 LBS

## 2022-05-10 DIAGNOSIS — M79.644 PAIN IN RIGHT FINGER(S): ICD-10-CM

## 2022-05-10 PROCEDURE — 99214 OFFICE O/P EST MOD 30 MIN: CPT

## 2022-05-11 LAB
25(OH)D3 SERPL-MCNC: 43.3 NG/ML
ALBUMIN SERPL ELPH-MCNC: 4 G/DL
ALP BLD-CCNC: 71 U/L
ALT SERPL-CCNC: 54 U/L
ANION GAP SERPL CALC-SCNC: 13 MMOL/L
APPEARANCE: CLEAR
APTT BLD: 29.1 SEC
AST SERPL-CCNC: 29 U/L
BACTERIA: NEGATIVE
BASOPHILS # BLD AUTO: 0.05 K/UL
BASOPHILS NFR BLD AUTO: 0.7 %
BILIRUB SERPL-MCNC: 0.2 MG/DL
BILIRUBIN URINE: NEGATIVE
BLOOD URINE: NORMAL
BUN SERPL-MCNC: 12 MG/DL
C3 SERPL-MCNC: 104 MG/DL
C4 SERPL-MCNC: 21 MG/DL
CALCIUM SERPL-MCNC: 9.2 MG/DL
CHLORIDE SERPL-SCNC: 105 MMOL/L
CHOLEST SERPL-MCNC: 113 MG/DL
CK SERPL-CCNC: 85 U/L
CO2 SERPL-SCNC: 21 MMOL/L
COLOR: YELLOW
CREAT SERPL-MCNC: 0.85 MG/DL
CREAT SPEC-SCNC: 172 MG/DL
CREAT/PROT UR: 0.2 RATIO
DSDNA AB SER-ACNC: 13 IU/ML
EGFR: 83 ML/MIN/1.73M2
EOSINOPHIL # BLD AUTO: 0.17 K/UL
EOSINOPHIL NFR BLD AUTO: 2.3 %
ESTIMATED AVERAGE GLUCOSE: 120 MG/DL
GLUCOSE QUALITATIVE U: NEGATIVE
HBA1C MFR BLD HPLC: 5.8 %
HCT VFR BLD CALC: 44.7 %
HDLC SERPL-MCNC: 24 MG/DL
HGB BLD-MCNC: 14.8 G/DL
HYALINE CASTS: 0 /LPF
IMM GRANULOCYTES NFR BLD AUTO: 0.4 %
INR PPP: 1.09 RATIO
KETONES URINE: NEGATIVE
LDLC SERPL CALC-MCNC: 78 MG/DL
LEUKOCYTE ESTERASE URINE: ABNORMAL
LYMPHOCYTES # BLD AUTO: 1.63 K/UL
LYMPHOCYTES NFR BLD AUTO: 22.1 %
MAN DIFF?: NORMAL
MCHC RBC-ENTMCNC: 28.9 PG
MCHC RBC-ENTMCNC: 33.1 GM/DL
MCV RBC AUTO: 87.3 FL
MICROSCOPIC-UA: NORMAL
MONOCYTES # BLD AUTO: 0.84 K/UL
MONOCYTES NFR BLD AUTO: 11.4 %
NEUTROPHILS # BLD AUTO: 4.67 K/UL
NEUTROPHILS NFR BLD AUTO: 63.1 %
NITRITE URINE: NEGATIVE
NONHDLC SERPL-MCNC: 89 MG/DL
PH URINE: 6.5
PLATELET # BLD AUTO: 306 K/UL
POTASSIUM SERPL-SCNC: 5 MMOL/L
PROT SERPL-MCNC: 7.9 G/DL
PROT UR-MCNC: 27 MG/DL
PROTEIN URINE: NORMAL
PT BLD: 12.6 SEC
RBC # BLD: 5.12 M/UL
RBC # FLD: 15.1 %
RED BLOOD CELLS URINE: 4 /HPF
SODIUM SERPL-SCNC: 139 MMOL/L
SPECIFIC GRAVITY URINE: 1.03
SQUAMOUS EPITHELIAL CELLS: 7 /HPF
TRIGL SERPL-MCNC: 55 MG/DL
TSH SERPL-ACNC: 2.57 UIU/ML
UROBILINOGEN URINE: NORMAL
WBC # FLD AUTO: 7.39 K/UL
WHITE BLOOD CELLS URINE: 74 /HPF

## 2022-05-12 LAB
B2 GLYCOPROT1 IGA SERPL IA-ACNC: <5 SAU
B2 GLYCOPROT1 IGG SER-ACNC: <5 SGU
B2 GLYCOPROT1 IGM SER-ACNC: 8.4 SMU
ENA RNP AB SER IA-ACNC: 0.4 AL
ENA SM AB SER IA-ACNC: <0.2 AL
ENA SS-A AB SER IA-ACNC: >8 AL
ENA SS-B AB SER IA-ACNC: 0.2 AL

## 2022-05-13 LAB
CONFIRM: 25.5 SEC
DRVVT IMM 1:2 NP PPP: NORMAL
DRVVT SCREEN TO CONFIRM RATIO: 0.96 RATIO
PS IGA SER QL: 1
PS IGG SER QL: 9 UNITS
PS IGM SER QL: <10 UNITS
SCREEN DRVVT: 29.1 SEC
SILICA CLOTTING TIME INTERPRETATION: NORMAL
SILICA CLOTTING TIME S/C: 0.84 RATIO

## 2022-05-14 LAB
CARDIOLIPIN IGM SER-MCNC: <5 GPL
DEPRECATED CARDIOLIPIN IGA SER: <5 APL

## 2022-05-17 LAB — CARDIOLIPIN IGM SER-MCNC: 10.8 MPL

## 2022-06-04 ENCOUNTER — NON-APPOINTMENT (OUTPATIENT)
Age: 52
End: 2022-06-04

## 2022-06-05 ENCOUNTER — TRANSCRIPTION ENCOUNTER (OUTPATIENT)
Age: 52
End: 2022-06-05

## 2022-06-05 ENCOUNTER — NON-APPOINTMENT (OUTPATIENT)
Age: 52
End: 2022-06-05

## 2022-06-06 ENCOUNTER — OUTPATIENT (OUTPATIENT)
Dept: OUTPATIENT SERVICES | Facility: HOSPITAL | Age: 52
LOS: 1 days | End: 2022-06-06

## 2022-06-06 ENCOUNTER — TRANSCRIPTION ENCOUNTER (OUTPATIENT)
Age: 52
End: 2022-06-06

## 2022-06-06 ENCOUNTER — APPOINTMENT (OUTPATIENT)
Dept: DISASTER EMERGENCY | Facility: HOSPITAL | Age: 52
End: 2022-06-06

## 2022-06-06 VITALS
DIASTOLIC BLOOD PRESSURE: 77 MMHG | OXYGEN SATURATION: 97 % | RESPIRATION RATE: 18 BRPM | TEMPERATURE: 98 F | SYSTOLIC BLOOD PRESSURE: 108 MMHG | HEART RATE: 99 BPM

## 2022-06-06 VITALS
DIASTOLIC BLOOD PRESSURE: 80 MMHG | SYSTOLIC BLOOD PRESSURE: 117 MMHG | HEART RATE: 84 BPM | RESPIRATION RATE: 17 BRPM | OXYGEN SATURATION: 98 % | TEMPERATURE: 98 F

## 2022-06-06 DIAGNOSIS — U07.1 COVID-19: ICD-10-CM

## 2022-06-06 RX ORDER — BEBTELOVIMAB 87.5 MG/ML
175 INJECTION, SOLUTION INTRAVENOUS ONCE
Refills: 0 | Status: COMPLETED | OUTPATIENT
Start: 2022-06-06 | End: 2022-06-06

## 2022-06-06 RX ADMIN — BEBTELOVIMAB 175 MILLIGRAM(S): 87.5 INJECTION, SOLUTION INTRAVENOUS at 15:00

## 2022-06-06 NOTE — MONOCLONAL ANTIBODY INFUSION - ASSESSMENT AND PLAN
ASSESSMENT:  Pt is a 53 y/o F who tested Covid + yesterday  referred by Dr. Quevedo who presents to infusion center for Monoclonal antibody infusion (Bebtelovimab)  Symptoms/ Criteria: cough, sore throat, congestion, HA, fatigue  Risk Profile includes: HTN, Lupus nephritis  Moderna x2    PLAN:  - infusion procedure explained to patient   - Consent for monoclonal antibody infusion obtained   - Risk & benefits discussed/all questions answered  - Bebtelovimab 175mg IVP over 30 seconds  - observe patient for one hour post infusion and discharge home

## 2022-07-10 ENCOUNTER — NON-APPOINTMENT (OUTPATIENT)
Age: 52
End: 2022-07-10

## 2022-10-13 ENCOUNTER — APPOINTMENT (OUTPATIENT)
Dept: ENDOCRINOLOGY | Facility: CLINIC | Age: 52
End: 2022-10-13

## 2022-10-13 VITALS
BODY MASS INDEX: 40.12 KG/M2 | HEART RATE: 75 BPM | WEIGHT: 218 LBS | HEIGHT: 62 IN | DIASTOLIC BLOOD PRESSURE: 78 MMHG | SYSTOLIC BLOOD PRESSURE: 122 MMHG | OXYGEN SATURATION: 98 %

## 2022-10-13 DIAGNOSIS — R53.83 OTHER FATIGUE: ICD-10-CM

## 2022-10-13 DIAGNOSIS — R73.03 PREDIABETES.: ICD-10-CM

## 2022-10-13 PROCEDURE — 99203 OFFICE O/P NEW LOW 30 MIN: CPT

## 2022-10-13 NOTE — ASSESSMENT
[FreeTextEntry1] : #Fatigue\par -Patient presenting for multiple symptoms including feeling heat, sweating, weight gain.  She also had an episode recently where she became lightheaded and dizzy and again sweating.\par -No prior endocrinological history or any family history of thyroid or adrenal issues\par -Given patient had a recent episode where she became lightheaded with sweating and palpitations, would screen patient for a pheochromocytoma by getting a plasma metanephrine\par -We will also get TFTs to rule out any thyroid abnormality\par -I will call patient with the lab results once I receive them\par \par #Prediabetes\par -Hemoglobin A1c noted to be 5.8% on recent labs\par -Patient to do lifestyle interventions

## 2022-10-13 NOTE — HISTORY OF PRESENT ILLNESS
[FreeTextEntry1] : Patient is presenting to our office to get evaluated as she has been having symptoms of sweating, anxiety.\par Other medical problems: Patient has a history of lupus nephritis, fibroids status post myomectomy\par \par Patient states that she has been feeling more sweaty than usual, more anxiety and occasionally feelings of tremulousness \par She also mentions that she has had some weight gain over the past year.  Patient mentions that she has been more sedentary as work has been from home and access to food is easier.\par She does mention that recently she was on an airplane and all of a sudden was feeling lightheaded, dizzy, sweaty\par Denies any previous thyroid issues\par Denies any family history of any thyroid or adrenal issues\par Denies any stretch marks on her belly or excessive bruising\par

## 2022-11-01 ENCOUNTER — APPOINTMENT (OUTPATIENT)
Dept: RHEUMATOLOGY | Facility: CLINIC | Age: 52
End: 2022-11-01

## 2022-11-01 VITALS
SYSTOLIC BLOOD PRESSURE: 126 MMHG | DIASTOLIC BLOOD PRESSURE: 79 MMHG | BODY MASS INDEX: 39.2 KG/M2 | OXYGEN SATURATION: 98 % | WEIGHT: 213 LBS | HEART RATE: 78 BPM | TEMPERATURE: 97.4 F | RESPIRATION RATE: 16 BRPM | HEIGHT: 62 IN

## 2022-11-01 DIAGNOSIS — J84.9 INTERSTITIAL PULMONARY DISEASE, UNSPECIFIED: ICD-10-CM

## 2022-11-01 DIAGNOSIS — M32.14 GLOMERULAR DISEASE IN SYSTEMIC LUPUS ERYTHEMATOSUS: ICD-10-CM

## 2022-11-01 PROCEDURE — 99215 OFFICE O/P EST HI 40 MIN: CPT | Mod: 25

## 2022-11-01 PROCEDURE — G0008: CPT

## 2022-11-01 PROCEDURE — 90686 IIV4 VACC NO PRSV 0.5 ML IM: CPT

## 2022-11-02 LAB
25(OH)D3 SERPL-MCNC: 54.4 NG/ML
ALBUMIN SERPL ELPH-MCNC: 4 G/DL
ALP BLD-CCNC: 70 U/L
ALT SERPL-CCNC: 45 U/L
ANION GAP SERPL CALC-SCNC: 12 MMOL/L
APPEARANCE: ABNORMAL
AST SERPL-CCNC: 28 U/L
BACTERIA: NEGATIVE
BASOPHILS # BLD AUTO: 0.04 K/UL
BASOPHILS NFR BLD AUTO: 0.5 %
BILIRUB SERPL-MCNC: 0.3 MG/DL
BILIRUBIN URINE: NEGATIVE
BLOOD URINE: ABNORMAL
BUN SERPL-MCNC: 15 MG/DL
C3 SERPL-MCNC: 115 MG/DL
C4 SERPL-MCNC: 20 MG/DL
CALCIUM OXALATE CRYSTALS: ABNORMAL
CALCIUM SERPL-MCNC: 9.7 MG/DL
CHLORIDE SERPL-SCNC: 105 MMOL/L
CK SERPL-CCNC: 79 U/L
CO2 SERPL-SCNC: 22 MMOL/L
COLOR: NORMAL
CORTIS SERPL-MCNC: 8 UG/DL
CREAT SERPL-MCNC: 0.83 MG/DL
CREAT SPEC-SCNC: 154 MG/DL
CREAT/PROT UR: 0.1 RATIO
EGFR: 85 ML/MIN/1.73M2
EOSINOPHIL # BLD AUTO: 0.17 K/UL
EOSINOPHIL NFR BLD AUTO: 2.2 %
GLUCOSE QUALITATIVE U: NEGATIVE
HCT VFR BLD CALC: 47.7 %
HGB BLD-MCNC: 15.7 G/DL
HYALINE CASTS: 2 /LPF
IMM GRANULOCYTES NFR BLD AUTO: 0.3 %
KETONES URINE: NORMAL
LEUKOCYTE ESTERASE URINE: ABNORMAL
LYMPHOCYTES # BLD AUTO: 1.84 K/UL
LYMPHOCYTES NFR BLD AUTO: 24.1 %
MAN DIFF?: NORMAL
MCHC RBC-ENTMCNC: 28.3 PG
MCHC RBC-ENTMCNC: 32.9 GM/DL
MCV RBC AUTO: 85.9 FL
MICROSCOPIC-UA: NORMAL
MONOCYTES # BLD AUTO: 0.86 K/UL
MONOCYTES NFR BLD AUTO: 11.3 %
NEUTROPHILS # BLD AUTO: 4.71 K/UL
NEUTROPHILS NFR BLD AUTO: 61.6 %
NITRITE URINE: NEGATIVE
PH URINE: 6
PLATELET # BLD AUTO: 325 K/UL
POTASSIUM SERPL-SCNC: 5 MMOL/L
PROT SERPL-MCNC: 8.3 G/DL
PROT UR-MCNC: 17 MG/DL
PROTEIN URINE: NORMAL
RBC # BLD: 5.55 M/UL
RBC # FLD: 15.4 %
RED BLOOD CELLS URINE: 2 /HPF
SODIUM SERPL-SCNC: 138 MMOL/L
SPECIFIC GRAVITY URINE: 1.03
SQUAMOUS EPITHELIAL CELLS: 4 /HPF
TSH SERPL-ACNC: 2.53 UIU/ML
UROBILINOGEN URINE: NORMAL
WBC # FLD AUTO: 7.64 K/UL
WHITE BLOOD CELLS URINE: 72 /HPF

## 2022-11-03 LAB
CHROMATIN AB SERPL-ACNC: 0.4 AL
DSDNA AB SER-ACNC: 12 IU/ML

## 2022-11-21 ENCOUNTER — NON-APPOINTMENT (OUTPATIENT)
Age: 52
End: 2022-11-21

## 2022-11-29 NOTE — ED PROVIDER NOTE - NS ED ROS FT
Constitutional: No fever or chills  Eyes: No visual changes, eye pain or redness  HEENT: No throat pain, ear pain, nasal pain. No nose bleeding.  CV: No chest pain or lower extremity edema  Resp: No SOB no cough  GI: No abd pain. No nausea or vomiting. No diarrhea. No constipation. +vaginal bleed   : No dysuria, hematuria.   MSK: No musculoskeletal pain  Skin: No rash  Neuro: No headache. No numbness or tingling. No weakness. Deep Sutures: 5-0 Monocryl

## 2022-12-02 ENCOUNTER — APPOINTMENT (OUTPATIENT)
Dept: CT IMAGING | Facility: IMAGING CENTER | Age: 52
End: 2022-12-02

## 2022-12-02 ENCOUNTER — OUTPATIENT (OUTPATIENT)
Dept: OUTPATIENT SERVICES | Facility: HOSPITAL | Age: 52
LOS: 1 days | End: 2022-12-02
Payer: COMMERCIAL

## 2022-12-02 DIAGNOSIS — J84.9 INTERSTITIAL PULMONARY DISEASE, UNSPECIFIED: ICD-10-CM

## 2022-12-02 PROCEDURE — 71250 CT THORAX DX C-: CPT

## 2022-12-02 PROCEDURE — 71250 CT THORAX DX C-: CPT | Mod: 26

## 2022-12-19 ENCOUNTER — APPOINTMENT (OUTPATIENT)
Dept: PULMONOLOGY | Facility: CLINIC | Age: 52
End: 2022-12-19

## 2022-12-19 VITALS
WEIGHT: 164 LBS | BODY MASS INDEX: 29.06 KG/M2 | OXYGEN SATURATION: 99 % | HEIGHT: 63 IN | DIASTOLIC BLOOD PRESSURE: 83 MMHG | SYSTOLIC BLOOD PRESSURE: 118 MMHG | TEMPERATURE: 97.7 F | HEART RATE: 99 BPM

## 2022-12-19 PROCEDURE — 94729 DIFFUSING CAPACITY: CPT

## 2022-12-19 PROCEDURE — 94726 PLETHYSMOGRAPHY LUNG VOLUMES: CPT

## 2022-12-19 PROCEDURE — 94010 BREATHING CAPACITY TEST: CPT

## 2023-03-08 NOTE — MONOCLONAL ANTIBODY INFUSION - EXAM
[FreeTextEntry3] : I have fully participated in the care of this patient. I have reviewed all pertinent clinical information, including history, physical exam, plan and the note and I agree.  CC: Monoclonal Antibody Infusion/COVID 19 Positive  52yFemale    exam/findings:  T(C): 36.7 (06-06-22 @ 15:00), Max: 36.7 (06-06-22 @ 15:00)  HR: 99 (06-06-22 @ 15:00) (99 - 99)  BP: 108/77 (06-06-22 @ 15:00) (108/77 - 108/77)  RR: 18 (06-06-22 @ 15:00) (18 - 18)  SpO2: 97% (06-06-22 @ 15:00) (97% - 97%)      PE:   Appearance: NAD	  HEENT:   Normal oral mucosa,   Lymphatic: No lymphadenopathy  Cardiovascular: Normal S1 S2, No JVD, No murmurs, No edema  Respiratory: Lungs clear to auscultation	  Gastrointestinal:  Soft, Non-tender, + BS	  Skin: warm and dry  Neurologic: Non-focal  Extremities: Normal range of motion

## 2023-07-07 ENCOUNTER — RX RENEWAL (OUTPATIENT)
Age: 53
End: 2023-07-07

## 2023-08-24 ENCOUNTER — NON-APPOINTMENT (OUTPATIENT)
Age: 53
End: 2023-08-24

## 2023-09-14 ENCOUNTER — NON-APPOINTMENT (OUTPATIENT)
Age: 53
End: 2023-09-14

## 2023-09-19 ENCOUNTER — APPOINTMENT (OUTPATIENT)
Dept: RHEUMATOLOGY | Facility: CLINIC | Age: 53
End: 2023-09-19
Payer: COMMERCIAL

## 2023-09-19 VITALS
HEART RATE: 94 BPM | WEIGHT: 204 LBS | HEIGHT: 63 IN | DIASTOLIC BLOOD PRESSURE: 87 MMHG | SYSTOLIC BLOOD PRESSURE: 134 MMHG | BODY MASS INDEX: 36.14 KG/M2 | OXYGEN SATURATION: 98 %

## 2023-09-19 DIAGNOSIS — I10 ESSENTIAL (PRIMARY) HYPERTENSION: ICD-10-CM

## 2023-09-19 DIAGNOSIS — M32.9 SYSTEMIC LUPUS ERYTHEMATOSUS, UNSPECIFIED: ICD-10-CM

## 2023-09-19 PROCEDURE — 99214 OFFICE O/P EST MOD 30 MIN: CPT

## 2023-09-20 LAB
25(OH)D3 SERPL-MCNC: 52.1 NG/ML
ALBUMIN SERPL ELPH-MCNC: 4.1 G/DL
ALP BLD-CCNC: 59 U/L
ALT SERPL-CCNC: 21 U/L
ANION GAP SERPL CALC-SCNC: 14 MMOL/L
APPEARANCE: CLEAR
AST SERPL-CCNC: 16 U/L
BACTERIA: NEGATIVE /HPF
BASOPHILS # BLD AUTO: 0.07 K/UL
BASOPHILS NFR BLD AUTO: 1.1 %
BILIRUB SERPL-MCNC: 0.4 MG/DL
BILIRUBIN URINE: NEGATIVE
BLOOD URINE: NEGATIVE
BUN SERPL-MCNC: 15 MG/DL
C3 SERPL-MCNC: 105 MG/DL
C4 SERPL-MCNC: 17 MG/DL
CALCIUM SERPL-MCNC: 8.9 MG/DL
CAST: 0 /LPF
CHLORIDE SERPL-SCNC: 103 MMOL/L
CO2 SERPL-SCNC: 21 MMOL/L
COLOR: YELLOW
CREAT SERPL-MCNC: 1.04 MG/DL
CREAT SPEC-SCNC: 73 MG/DL
CREAT/PROT UR: 0.1 RATIO
DSDNA AB SER-ACNC: 26 IU/ML
EGFR: 65 ML/MIN/1.73M2
EOSINOPHIL # BLD AUTO: 0.19 K/UL
EOSINOPHIL NFR BLD AUTO: 3.1 %
EPITHELIAL CELLS: 2 /HPF
GLUCOSE QUALITATIVE U: NEGATIVE MG/DL
HCT VFR BLD CALC: 47.2 %
HGB BLD-MCNC: 14.9 G/DL
IMM GRANULOCYTES NFR BLD AUTO: 0.2 %
KETONES URINE: NEGATIVE MG/DL
LEUKOCYTE ESTERASE URINE: ABNORMAL
LYMPHOCYTES # BLD AUTO: 1.43 K/UL
LYMPHOCYTES NFR BLD AUTO: 23.1 %
MAN DIFF?: NORMAL
MCHC RBC-ENTMCNC: 28 PG
MCHC RBC-ENTMCNC: 31.6 GM/DL
MCV RBC AUTO: 88.7 FL
MICROSCOPIC-UA: NORMAL
MONOCYTES # BLD AUTO: 0.77 K/UL
MONOCYTES NFR BLD AUTO: 12.4 %
NEUTROPHILS # BLD AUTO: 3.73 K/UL
NEUTROPHILS NFR BLD AUTO: 60.1 %
NITRITE URINE: NEGATIVE
PH URINE: 6.5
PLATELET # BLD AUTO: 285 K/UL
POTASSIUM SERPL-SCNC: 4.8 MMOL/L
PROT SERPL-MCNC: 8 G/DL
PROT UR-MCNC: 4 MG/DL
PROTEIN URINE: NEGATIVE MG/DL
RBC # BLD: 5.32 M/UL
RBC # FLD: 16.3 %
RED BLOOD CELLS URINE: 2 /HPF
SODIUM SERPL-SCNC: 139 MMOL/L
SPECIFIC GRAVITY URINE: 1.02
UROBILINOGEN URINE: 0.2 MG/DL
WBC # FLD AUTO: 6.2 K/UL
WHITE BLOOD CELLS URINE: 5 /HPF

## 2023-09-21 LAB
ENA RNP AB SER IA-ACNC: 0.4 AL
ENA SM AB SER IA-ACNC: <0.2 AL
ENA SS-A AB SER IA-ACNC: >8 AL
ENA SS-B AB SER IA-ACNC: 0.2 AL

## 2023-11-21 RX ORDER — LOSARTAN POTASSIUM AND HYDROCHLOROTHIAZIDE 12.5; 5 MG/1; MG/1
50-12.5 TABLET ORAL
Qty: 90 | Refills: 3 | Status: ACTIVE | COMMUNITY
Start: 2019-08-25 | End: 1900-01-01

## 2024-03-08 ENCOUNTER — NON-APPOINTMENT (OUTPATIENT)
Age: 54
End: 2024-03-08

## 2024-07-18 ENCOUNTER — INPATIENT (INPATIENT)
Facility: HOSPITAL | Age: 54
LOS: 0 days | Discharge: HOME | DRG: 948 | End: 2024-07-19
Attending: INTERNAL MEDICINE | Admitting: INTERNAL MEDICINE
Payer: COMMERCIAL

## 2024-07-18 VITALS
TEMPERATURE: 98 F | DIASTOLIC BLOOD PRESSURE: 67 MMHG | RESPIRATION RATE: 17 BRPM | HEART RATE: 105 BPM | SYSTOLIC BLOOD PRESSURE: 109 MMHG | OXYGEN SATURATION: 99 %

## 2024-07-18 DIAGNOSIS — R09.89 OTHER SPECIFIED SYMPTOMS AND SIGNS INVOLVING THE CIRCULATORY AND RESPIRATORY SYSTEMS: ICD-10-CM

## 2024-07-18 DIAGNOSIS — R07.9 CHEST PAIN, UNSPECIFIED: ICD-10-CM

## 2024-07-18 DIAGNOSIS — R53.1 WEAKNESS: ICD-10-CM

## 2024-07-18 DIAGNOSIS — N30.00 ACUTE CYSTITIS WITHOUT HEMATURIA: ICD-10-CM

## 2024-07-18 DIAGNOSIS — R10.9 UNSPECIFIED ABDOMINAL PAIN: ICD-10-CM

## 2024-07-18 DIAGNOSIS — R00.0 TACHYCARDIA, UNSPECIFIED: ICD-10-CM

## 2024-07-18 DIAGNOSIS — M32.9 SYSTEMIC LUPUS ERYTHEMATOSUS, UNSPECIFIED: ICD-10-CM

## 2024-07-18 LAB
ALBUMIN SERPL ELPH-MCNC: 3.7 G/DL — SIGNIFICANT CHANGE UP (ref 3.3–5)
ALP SERPL-CCNC: 70 U/L — SIGNIFICANT CHANGE UP (ref 40–120)
ALT FLD-CCNC: 20 U/L — SIGNIFICANT CHANGE UP (ref 10–45)
ANION GAP SERPL CALC-SCNC: 14 MMOL/L — SIGNIFICANT CHANGE UP (ref 5–17)
APPEARANCE UR: CLEAR — SIGNIFICANT CHANGE UP
AST SERPL-CCNC: 25 U/L — SIGNIFICANT CHANGE UP (ref 10–40)
BACTERIA # UR AUTO: NEGATIVE /HPF — SIGNIFICANT CHANGE UP
BASE EXCESS BLDV CALC-SCNC: -3.5 MMOL/L — LOW (ref -2–3)
BASOPHILS # BLD AUTO: 0.04 K/UL — SIGNIFICANT CHANGE UP (ref 0–0.2)
BASOPHILS NFR BLD AUTO: 0.2 % — SIGNIFICANT CHANGE UP (ref 0–2)
BILIRUB SERPL-MCNC: 0.6 MG/DL — SIGNIFICANT CHANGE UP (ref 0.2–1.2)
BILIRUB UR-MCNC: NEGATIVE — SIGNIFICANT CHANGE UP
BUN SERPL-MCNC: 10 MG/DL — SIGNIFICANT CHANGE UP (ref 7–23)
CA-I SERPL-SCNC: 1.18 MMOL/L — SIGNIFICANT CHANGE UP (ref 1.15–1.33)
CALCIUM SERPL-MCNC: 8.9 MG/DL — SIGNIFICANT CHANGE UP (ref 8.4–10.5)
CAST: 0 /LPF — SIGNIFICANT CHANGE UP (ref 0–4)
CHLORIDE BLDV-SCNC: 105 MMOL/L — SIGNIFICANT CHANGE UP (ref 96–108)
CHLORIDE SERPL-SCNC: 105 MMOL/L — SIGNIFICANT CHANGE UP (ref 96–108)
CO2 BLDV-SCNC: 21 MMOL/L — LOW (ref 22–26)
CO2 SERPL-SCNC: 18 MMOL/L — LOW (ref 22–31)
COLOR SPEC: YELLOW — SIGNIFICANT CHANGE UP
CREAT SERPL-MCNC: 0.77 MG/DL — SIGNIFICANT CHANGE UP (ref 0.5–1.3)
CRP SERPL-MCNC: <3 MG/L — SIGNIFICANT CHANGE UP (ref 0–4)
D DIMER BLD IA.RAPID-MCNC: 311 NG/ML DDU — HIGH
DIFF PNL FLD: NEGATIVE — SIGNIFICANT CHANGE UP
EGFR: 92 ML/MIN/1.73M2 — SIGNIFICANT CHANGE UP
EOSINOPHIL # BLD AUTO: 0.01 K/UL — SIGNIFICANT CHANGE UP (ref 0–0.5)
EOSINOPHIL NFR BLD AUTO: 0.1 % — SIGNIFICANT CHANGE UP (ref 0–6)
FLUAV AG NPH QL: SIGNIFICANT CHANGE UP
FLUBV AG NPH QL: SIGNIFICANT CHANGE UP
GAS PNL BLDV: 135 MMOL/L — LOW (ref 136–145)
GAS PNL BLDV: SIGNIFICANT CHANGE UP
GAS PNL BLDV: SIGNIFICANT CHANGE UP
GLUCOSE BLDV-MCNC: 100 MG/DL — HIGH (ref 70–99)
GLUCOSE SERPL-MCNC: 97 MG/DL — SIGNIFICANT CHANGE UP (ref 70–99)
GLUCOSE UR QL: NEGATIVE MG/DL — SIGNIFICANT CHANGE UP
HCG SERPL-ACNC: <2 MIU/ML — SIGNIFICANT CHANGE UP
HCO3 BLDV-SCNC: 20 MMOL/L — LOW (ref 22–29)
HCT VFR BLD CALC: 37.9 % — SIGNIFICANT CHANGE UP (ref 34.5–45)
HCT VFR BLDA CALC: 39 % — SIGNIFICANT CHANGE UP (ref 34.5–46.5)
HGB BLD CALC-MCNC: 13 G/DL — SIGNIFICANT CHANGE UP (ref 11.7–16.1)
HGB BLD-MCNC: 12.7 G/DL — SIGNIFICANT CHANGE UP (ref 11.5–15.5)
IMM GRANULOCYTES NFR BLD AUTO: 1 % — HIGH (ref 0–0.9)
KETONES UR-MCNC: ABNORMAL MG/DL
LACTATE BLDV-MCNC: 1.4 MMOL/L — SIGNIFICANT CHANGE UP (ref 0.5–2)
LEUKOCYTE ESTERASE UR-ACNC: ABNORMAL
LIDOCAIN IGE QN: 67 U/L — HIGH (ref 7–60)
LYMPHOCYTES # BLD AUTO: 0.61 K/UL — LOW (ref 1–3.3)
LYMPHOCYTES # BLD AUTO: 3.1 % — LOW (ref 13–44)
MCHC RBC-ENTMCNC: 28.7 PG — SIGNIFICANT CHANGE UP (ref 27–34)
MCHC RBC-ENTMCNC: 33.5 GM/DL — SIGNIFICANT CHANGE UP (ref 32–36)
MCV RBC AUTO: 85.7 FL — SIGNIFICANT CHANGE UP (ref 80–100)
MONOCYTES # BLD AUTO: 0.87 K/UL — SIGNIFICANT CHANGE UP (ref 0–0.9)
MONOCYTES NFR BLD AUTO: 4.4 % — SIGNIFICANT CHANGE UP (ref 2–14)
NEUTROPHILS # BLD AUTO: 18.03 K/UL — HIGH (ref 1.8–7.4)
NEUTROPHILS NFR BLD AUTO: 91.2 % — HIGH (ref 43–77)
NITRITE UR-MCNC: NEGATIVE — SIGNIFICANT CHANGE UP
NRBC # BLD: 0 /100 WBCS — SIGNIFICANT CHANGE UP (ref 0–0)
PCO2 BLDV: 32 MMHG — LOW (ref 39–42)
PH BLDV: 7.41 — SIGNIFICANT CHANGE UP (ref 7.32–7.43)
PH UR: 7.5 — SIGNIFICANT CHANGE UP (ref 5–8)
PLATELET # BLD AUTO: 230 K/UL — SIGNIFICANT CHANGE UP (ref 150–400)
PO2 BLDV: 53 MMHG — HIGH (ref 25–45)
POTASSIUM BLDV-SCNC: 3.6 MMOL/L — SIGNIFICANT CHANGE UP (ref 3.5–5.1)
POTASSIUM SERPL-MCNC: 3.8 MMOL/L — SIGNIFICANT CHANGE UP (ref 3.5–5.3)
POTASSIUM SERPL-SCNC: 3.8 MMOL/L — SIGNIFICANT CHANGE UP (ref 3.5–5.3)
PROT SERPL-MCNC: 8.1 G/DL — SIGNIFICANT CHANGE UP (ref 6–8.3)
PROT UR-MCNC: NEGATIVE MG/DL — SIGNIFICANT CHANGE UP
RBC # BLD: 4.42 M/UL — SIGNIFICANT CHANGE UP (ref 3.8–5.2)
RBC # FLD: 14.4 % — SIGNIFICANT CHANGE UP (ref 10.3–14.5)
RBC CASTS # UR COMP ASSIST: 1 /HPF — SIGNIFICANT CHANGE UP (ref 0–4)
RSV RNA NPH QL NAA+NON-PROBE: SIGNIFICANT CHANGE UP
SAO2 % BLDV: 86.7 % — SIGNIFICANT CHANGE UP (ref 67–88)
SARS-COV-2 RNA SPEC QL NAA+PROBE: SIGNIFICANT CHANGE UP
SODIUM SERPL-SCNC: 137 MMOL/L — SIGNIFICANT CHANGE UP (ref 135–145)
SP GR SPEC: 1.01 — SIGNIFICANT CHANGE UP (ref 1–1.03)
SQUAMOUS # UR AUTO: 1 /HPF — SIGNIFICANT CHANGE UP (ref 0–5)
TROPONIN T, HIGH SENSITIVITY RESULT: <6 NG/L — SIGNIFICANT CHANGE UP (ref 0–51)
TROPONIN T, HIGH SENSITIVITY RESULT: <6 NG/L — SIGNIFICANT CHANGE UP (ref 0–51)
UROBILINOGEN FLD QL: 0.2 MG/DL — SIGNIFICANT CHANGE UP (ref 0.2–1)
WBC # BLD: 19.75 K/UL — HIGH (ref 3.8–10.5)
WBC # FLD AUTO: 19.75 K/UL — HIGH (ref 3.8–10.5)
WBC UR QL: 6 /HPF — HIGH (ref 0–5)

## 2024-07-18 PROCEDURE — 99223 1ST HOSP IP/OBS HIGH 75: CPT

## 2024-07-18 PROCEDURE — 99285 EMERGENCY DEPT VISIT HI MDM: CPT

## 2024-07-18 PROCEDURE — 74177 CT ABD & PELVIS W/CONTRAST: CPT | Mod: 26,MC

## 2024-07-18 PROCEDURE — 76705 ECHO EXAM OF ABDOMEN: CPT | Mod: 26

## 2024-07-18 PROCEDURE — 71275 CT ANGIOGRAPHY CHEST: CPT | Mod: 26

## 2024-07-18 RX ORDER — LIDOCAINE HCL 28 MG/G
1 GEL TOPICAL DAILY
Refills: 0 | Status: DISCONTINUED | OUTPATIENT
Start: 2024-07-18 | End: 2024-07-19

## 2024-07-18 RX ORDER — TAMOXIFEN CITRATE 10 MG/1
1 TABLET, FILM COATED ORAL
Refills: 0 | DISCHARGE

## 2024-07-18 RX ORDER — TAMOXIFEN CITRATE 10 MG/1
20 TABLET, FILM COATED ORAL DAILY
Refills: 0 | Status: DISCONTINUED | OUTPATIENT
Start: 2024-07-18 | End: 2024-07-19

## 2024-07-18 RX ORDER — SODIUM CHLORIDE 0.9 % (FLUSH) 0.9 %
1000 SYRINGE (ML) INJECTION ONCE
Refills: 0 | Status: COMPLETED | OUTPATIENT
Start: 2024-07-18 | End: 2024-07-18

## 2024-07-18 RX ORDER — SODIUM CHLORIDE 0.9 % (FLUSH) 0.9 %
1000 SYRINGE (ML) INJECTION
Refills: 0 | Status: DISCONTINUED | OUTPATIENT
Start: 2024-07-18 | End: 2024-07-19

## 2024-07-18 RX ORDER — POLYETHYLENE GLYCOL 3350 1 G/G
17 POWDER ORAL
Refills: 0 | Status: DISCONTINUED | OUTPATIENT
Start: 2024-07-18 | End: 2024-07-19

## 2024-07-18 RX ORDER — ENOXAPARIN SODIUM 100 MG/ML
40 INJECTION SUBCUTANEOUS EVERY 24 HOURS
Refills: 0 | Status: DISCONTINUED | OUTPATIENT
Start: 2024-07-18 | End: 2024-07-19

## 2024-07-18 RX ORDER — MAGNESIUM, ALUMINUM HYDROXIDE 400-400
30 TABLET,CHEWABLE ORAL EVERY 4 HOURS
Refills: 0 | Status: DISCONTINUED | OUTPATIENT
Start: 2024-07-18 | End: 2024-07-19

## 2024-07-18 RX ORDER — ACETAMINOPHEN 325 MG
650 TABLET ORAL EVERY 6 HOURS
Refills: 0 | Status: DISCONTINUED | OUTPATIENT
Start: 2024-07-18 | End: 2024-07-19

## 2024-07-18 RX ORDER — ONDANSETRON HYDROCHLORIDE 2 MG/ML
4 INJECTION INTRAMUSCULAR; INTRAVENOUS EVERY 8 HOURS
Refills: 0 | Status: DISCONTINUED | OUTPATIENT
Start: 2024-07-18 | End: 2024-07-19

## 2024-07-18 RX ORDER — SENNOSIDES 8.6 MG
1 TABLET ORAL AT BEDTIME
Refills: 0 | Status: DISCONTINUED | OUTPATIENT
Start: 2024-07-18 | End: 2024-07-19

## 2024-07-18 RX ORDER — CEFTRIAXONE SODIUM 500 MG
1000 VIAL (EA) INJECTION EVERY 24 HOURS
Refills: 0 | Status: DISCONTINUED | OUTPATIENT
Start: 2024-07-18 | End: 2024-07-19

## 2024-07-18 RX ORDER — GABAPENTIN
100 POWDER (GRAM) MISCELLANEOUS EVERY 8 HOURS
Refills: 0 | Status: DISCONTINUED | OUTPATIENT
Start: 2024-07-18 | End: 2024-07-19

## 2024-07-18 RX ADMIN — Medication 1000 MILLILITER(S): at 14:18

## 2024-07-18 RX ADMIN — Medication 100 MILLIGRAM(S): at 22:01

## 2024-07-18 NOTE — H&P ADULT - PROBLEM SELECTOR PLAN 1
- non exertional, persistent.   - Given high risk (cancer, SLE, recent car ride) and elevated  D dimer will obtain CTPE and LE Doppler to evaluate for DVT/PE.  - Troponin pending, suspicion for acute MI lower given EKG and history.   - multimodal pain regimen

## 2024-07-18 NOTE — ED PROCEDURE NOTE - PROCEDURE ADDITIONAL DETAILS
RUQ US and appendix US performed.    Emergency Department Focused Ultrasound performed at patient's bedside for educational purposes. The study will have a follow up study performed or was performed in the direct supervision of an ultrasound trained attending.

## 2024-07-18 NOTE — H&P ADULT - ASSESSMENT
Eugenio Raya is a 55 y/o F with PMH significant for SLE c/b lupus nephritis, ILD, AOCD, HTN, and recently diagnosed breast cancer s/p lumpectomy and XRT presenting with chills, chest pain, and abdominal pain. CT abdomen/pelvis unremarkable. CT PE and LE venous doppler pending. Possible UTI on rocephin.

## 2024-07-18 NOTE — H&P ADULT - HISTORY OF PRESENT ILLNESS
Eugenio Raya is a 53 y/o F with PMH significant for SLE c/b lupus nephritis, ILD, AOCD, HTN, and recently diagnosed breast cancer s/p lumpectomy and XRT presenting with chills, chest pain, and abdominal pain. Episode occurred today around 9:30 AM where patient began to have shaking and chills followed by cramping abdominal pain and chest pain. She states symptoms are similar to prior lupus flare. 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 As far as abdominal pain, it is across lower abdomen with associated nausea but denies any vomiting or diarrhea. Admits to constipation for which she is supposed to take colace. Also with urinary frequency but denies any dysuria or blood in urine. Also complains of recent LE swelling after a weekend trip out of town which she spent extended hours in the car. For her Lupus she is not on any current therapies. Follows with Rheumatologist Dr. Linn, last seen one year ago and is due for followup.

## 2024-07-18 NOTE — H&P ADULT - NSHPLABSRESULTS_GEN_ALL_CORE
LABS:                         12.7   19.75 )-----------( 230      ( 2024 14:18 )             37.9     07-18    137  |  105  |  10  ----------------------------<  97  3.8   |  18<L>  |  0.77    Ca    8.9      2024 14:18    TPro  8.1  /  Alb  3.7  /  TBili  0.6  /  DBili  x   /  AST  25  /  ALT  20  /  AlkPhos  70  07-18      Urinalysis Basic - ( 2024 17:12 )    Color: Yellow / Appearance: Clear / S.010 / pH: x  Gluc: x / Ketone: Trace mg/dL  / Bili: Negative / Urobili: 0.2 mg/dL   Blood: x / Protein: Negative mg/dL / Nitrite: Negative   Leuk Esterase: Trace / RBC: 1 /HPF / WBC 6 /HPF   Sq Epi: x / Non Sq Epi: 1 /HPF / Bacteria: Negative /HPF              Records reviewed from prior hospitalization.  Labs reviewed remarkable for   EKG personally reviewed   CXR personally reviewed

## 2024-07-18 NOTE — ED ADULT NURSE NOTE - NSFALLUNIVINTERV_ED_ALL_ED
Bed/Stretcher in lowest position, wheels locked, appropriate side rails in place/Call bell, personal items and telephone in reach/Instruct patient to call for assistance before getting out of bed/chair/stretcher/Non-slip footwear applied when patient is off stretcher/[place] to call system/Physically safe environment - no spills, clutter or unnecessary equipment/Purposeful proactive rounding/Room/bathroom lighting operational, light cord in reach

## 2024-07-18 NOTE — CHART NOTE - NSCHARTNOTEFT_GEN_A_CORE
53 y/o female with ER 95% CT 80% HER20 pT1bN0 L breast cancer s/p L lumpectomy/SLNB on 3/4/24, s/p partial breast RT from May till June 2024, currently on Tamoxifen, and history of SLE. Follows with Dr. Westbrook at [facility] [place].     If admitted to medicine, please admit to Dr. Coates.     Gurinder Velasco PA-C  Hematology/Oncology  [facility]  [phone] (office)

## 2024-07-18 NOTE — H&P ADULT - PROBLEM SELECTOR PLAN 5
- Pain may be 2/2 acute lupus flare if above ruled out.   - Will need close follow up with outpatient rheumatologist.     - Reviewing, and interpreting labs and testing.  - Independently obtaining a review of systems and performing a physical exam  - Reviewing consultant documentation/recommendations in addition to discussing plan of care with consultants.  - Counselling and educating patient and family regarding interpretation of aforementioned items    Time Spent= 94 minutes

## 2024-07-18 NOTE — ED ADULT NURSE NOTE - TEMPLATE LIST FOR HEAD TO TOE ASSESSMENT
Pt called stating that he was an inpatient/out pt and he is scheduled for a 2 week hospital follow up with  on 08/06/2021.  States he will need a DIPAK and Loop recorder scheduled also, please advise, pt's call back number is [phone].   Abdominal Pain, N/V/D

## 2024-07-18 NOTE — ED PROVIDER NOTE - ATTENDING APP SHARED VISIT CONTRIBUTION OF CARE
PMD Leroy Linn Rheum  54y f pmh Breast ca–postresection and radiation without chemo., SLE, hypertension, anemia, interstitial lung disease, lupus nephritis prediabetes.  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  When returning to school had onset of dizziness back cramps and stomach cramps with some right-sided chest pain, and significant chills without fever.  Patient denies dysuria, nausea vomiting, cough, sore throat, earache, rash.  Patient was seen yesterday by GYN with concerns for possible vaginal discharge.  Cultures from that exam are pending.  Taken together patient states the symptoms are consistent with her previous lupus flare of several years ago.  Physical exam adult female awake alert NAD.  HEENT normocephalic atraumatic neck supple.  Chest there is no respiratory distress there is no use of  muscles.  CV no rubs gallops murmurs.  Abdomen, there is tenderness to the abdomen generalized especially right lower quadrant and right upper quadrant.  There is no CVA tenderness there is no rash there is no rebound guarding or masses.  Neuro GCS 15 speech fluent power 5/5 all extremities  Anastacio Browning MD, Facep

## 2024-07-18 NOTE — ED PROVIDER NOTE - OBJECTIVE STATEMENT
55 yo F with a PMH of breast ca sp resection/RT follows with [facility], HTN, SLE cb nephritis, ILD, anemia p/w abd pain, dizziness and chest pain x today. 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 States sxs all came on at once and have been constant since onset. Chest pain described as pressure and abd pain described as cramps. Also states that feels "cold and can't get warm." Reports sxs are similar to prior SLE flare x 13 years ago. No recent illness or sick contacts. Denies nausea, vomiting, fever, chills, SOB, cough, palpitations, diarrhea, dysuria, headache, syncope.

## 2024-07-18 NOTE — ED ADULT NURSE NOTE - OBJECTIVE STATEMENT
Patient is a 54y female presenting to the ED ambulatory from home with c/o abdominal pain. PMH of SLE, breast cancer, HTN. Patient A&Ox4. Patient reports developing abdominal pain and cramping this morning accompanied by "feeling generally unwell," diaphoresis, chest pressure, subjective chills while at work. Reports not eating anything this morning. Respirations spontaneous, even, and non-labored. Abdomen soft, non-distended and non-tender upon palpation. No swelling or redness noted to lower extremities.

## 2024-07-18 NOTE — H&P ADULT - NSHPPHYSICALEXAM_GEN_ALL_CORE
Vital Signs Last 24 Hrs  T(C): 36.6 (18 Jul 2024 11:52), Max: 36.6 (18 Jul 2024 11:52)  T(F): 97.8 (18 Jul 2024 11:52), Max: 97.8 (18 Jul 2024 11:52)  HR: 105 (18 Jul 2024 11:52) (105 - 105)  BP: 109/67 (18 Jul 2024 11:52) (109/67 - 109/67)  BP(mean): --  RR: 17 (18 Jul 2024 11:52) (17 - 17)  SpO2: 99% (18 Jul 2024 11:52) (99% - 99%)    Parameters below as of 18 Jul 2024 11:52  Patient On (Oxygen Delivery Method): conventional ventilator        PHYSICAL EXAM:  GENERAL:  anxious appearing  HEAD:  NCAT  EYES: conjunctiva clear  NECK: Supple, No JVD  CHEST/LUNG: CTA B/L. No w/r/r.  HEART: tachycardic. Normal S1, S2. No m/r/g.   ABDOMEN: TTP throughout, no guarding or rebound tenderness  EXTREMITIES:  2+ Peripheral Pulses, No clubbing, cyanosis, edema.  PSYCH: AAOx3, appropriate affect  NEUROLOGY: grossly non-focal  SKIN: No rashes or lesions

## 2024-07-18 NOTE — ED PROVIDER NOTE - CLINICAL SUMMARY MEDICAL DECISION MAKING FREE TEXT BOX
Adult female past medical history SLE, breast cancer in remission. Presents with a 1 day history starting at 9:30 AM of chills, fatigue, weakness, backache, abdominal pain.  Patient reports similar symptoms in the past representing lupus flares.  She spoke to rheumatology referred to ER.  Physical exam mild abdominal tenderness specially right-sided.  Concerns for as above lupus flare,cholecystitis,, appendicitis, diverticular disease,  Urinary tract infection.  Plan check CT abdomen pelvis with contrast, right upper quadrant sono, basic labs urine, rheumatology consultation.  Anastacio Browning MD, Facep

## 2024-07-19 ENCOUNTER — NON-APPOINTMENT (OUTPATIENT)
Age: 54
End: 2024-07-19

## 2024-07-19 ENCOUNTER — TRANSCRIPTION ENCOUNTER (OUTPATIENT)
Age: 54
End: 2024-07-19

## 2024-07-19 VITALS
RESPIRATION RATE: 18 BRPM | TEMPERATURE: 99 F | HEART RATE: 82 BPM | SYSTOLIC BLOOD PRESSURE: 119 MMHG | OXYGEN SATURATION: 99 % | DIASTOLIC BLOOD PRESSURE: 84 MMHG

## 2024-07-19 LAB
ANION GAP SERPL CALC-SCNC: 12 MMOL/L — SIGNIFICANT CHANGE UP (ref 5–17)
BUN SERPL-MCNC: 8 MG/DL — SIGNIFICANT CHANGE UP (ref 7–23)
C3 SERPL-MCNC: 80 MG/DL — LOW (ref 81–157)
C4 SERPL-MCNC: 15 MG/DL — SIGNIFICANT CHANGE UP (ref 13–39)
CALCIUM SERPL-MCNC: 8.5 MG/DL — SIGNIFICANT CHANGE UP (ref 8.4–10.5)
CHLORIDE SERPL-SCNC: 106 MMOL/L — SIGNIFICANT CHANGE UP (ref 96–108)
CO2 SERPL-SCNC: 19 MMOL/L — LOW (ref 22–31)
CREAT SERPL-MCNC: 0.79 MG/DL — SIGNIFICANT CHANGE UP (ref 0.5–1.3)
DSDNA AB SER-ACNC: 12 IU/ML — HIGH
EGFR: 89 ML/MIN/1.73M2 — SIGNIFICANT CHANGE UP
ERYTHROCYTE [SEDIMENTATION RATE] IN BLOOD: 58 MM/HR — HIGH (ref 0–20)
GLUCOSE SERPL-MCNC: 78 MG/DL — SIGNIFICANT CHANGE UP (ref 70–99)
HCT VFR BLD CALC: 33.9 % — LOW (ref 34.5–45)
HGB BLD-MCNC: 11.2 G/DL — LOW (ref 11.5–15.5)
MCHC RBC-ENTMCNC: 28.4 PG — SIGNIFICANT CHANGE UP (ref 27–34)
MCHC RBC-ENTMCNC: 33 GM/DL — SIGNIFICANT CHANGE UP (ref 32–36)
MCV RBC AUTO: 85.8 FL — SIGNIFICANT CHANGE UP (ref 80–100)
NRBC # BLD: 0 /100 WBCS — SIGNIFICANT CHANGE UP (ref 0–0)
PLATELET # BLD AUTO: 184 K/UL — SIGNIFICANT CHANGE UP (ref 150–400)
POTASSIUM SERPL-MCNC: 3.3 MMOL/L — LOW (ref 3.5–5.3)
POTASSIUM SERPL-SCNC: 3.3 MMOL/L — LOW (ref 3.5–5.3)
RBC # BLD: 3.95 M/UL — SIGNIFICANT CHANGE UP (ref 3.8–5.2)
RBC # FLD: 14.6 % — HIGH (ref 10.3–14.5)
SODIUM SERPL-SCNC: 137 MMOL/L — SIGNIFICANT CHANGE UP (ref 135–145)
WBC # BLD: 17.71 K/UL — HIGH (ref 3.8–10.5)
WBC # FLD AUTO: 17.71 K/UL — HIGH (ref 3.8–10.5)

## 2024-07-19 PROCEDURE — 71275 CT ANGIOGRAPHY CHEST: CPT | Mod: MC

## 2024-07-19 PROCEDURE — 84484 ASSAY OF TROPONIN QUANT: CPT

## 2024-07-19 PROCEDURE — 85027 COMPLETE CBC AUTOMATED: CPT

## 2024-07-19 PROCEDURE — 93970 EXTREMITY STUDY: CPT | Mod: 26

## 2024-07-19 PROCEDURE — 76705 ECHO EXAM OF ABDOMEN: CPT

## 2024-07-19 PROCEDURE — 74177 CT ABD & PELVIS W/CONTRAST: CPT | Mod: MC

## 2024-07-19 PROCEDURE — 86140 C-REACTIVE PROTEIN: CPT

## 2024-07-19 PROCEDURE — 86160 COMPLEMENT ANTIGEN: CPT

## 2024-07-19 PROCEDURE — 82435 ASSAY OF BLOOD CHLORIDE: CPT

## 2024-07-19 PROCEDURE — 99285 EMERGENCY DEPT VISIT HI MDM: CPT

## 2024-07-19 PROCEDURE — 36415 COLL VENOUS BLD VENIPUNCTURE: CPT

## 2024-07-19 PROCEDURE — 84295 ASSAY OF SERUM SODIUM: CPT

## 2024-07-19 PROCEDURE — 84702 CHORIONIC GONADOTROPIN TEST: CPT

## 2024-07-19 PROCEDURE — 85379 FIBRIN DEGRADATION QUANT: CPT

## 2024-07-19 PROCEDURE — 86225 DNA ANTIBODY NATIVE: CPT

## 2024-07-19 PROCEDURE — 81001 URINALYSIS AUTO W/SCOPE: CPT

## 2024-07-19 PROCEDURE — 82803 BLOOD GASES ANY COMBINATION: CPT

## 2024-07-19 PROCEDURE — 80048 BASIC METABOLIC PNL TOTAL CA: CPT

## 2024-07-19 PROCEDURE — 84132 ASSAY OF SERUM POTASSIUM: CPT

## 2024-07-19 PROCEDURE — 83690 ASSAY OF LIPASE: CPT

## 2024-07-19 PROCEDURE — 82947 ASSAY GLUCOSE BLOOD QUANT: CPT

## 2024-07-19 PROCEDURE — 99222 1ST HOSP IP/OBS MODERATE 55: CPT | Mod: GC

## 2024-07-19 PROCEDURE — 85014 HEMATOCRIT: CPT

## 2024-07-19 PROCEDURE — 82330 ASSAY OF CALCIUM: CPT

## 2024-07-19 PROCEDURE — 83605 ASSAY OF LACTIC ACID: CPT

## 2024-07-19 PROCEDURE — 85025 COMPLETE CBC W/AUTO DIFF WBC: CPT

## 2024-07-19 PROCEDURE — 85018 HEMOGLOBIN: CPT

## 2024-07-19 PROCEDURE — 87637 SARSCOV2&INF A&B&RSV AMP PRB: CPT

## 2024-07-19 PROCEDURE — 93970 EXTREMITY STUDY: CPT

## 2024-07-19 PROCEDURE — 85652 RBC SED RATE AUTOMATED: CPT

## 2024-07-19 PROCEDURE — 80053 COMPREHEN METABOLIC PANEL: CPT

## 2024-07-19 RX ORDER — LOSARTAN/HYDROCHLOROTHIAZIDE 100MG-25MG
0 TABLET ORAL
Refills: 0 | DISCHARGE

## 2024-07-19 RX ORDER — CEFUROXIME SODIUM 7.5 G
1 VIAL (EA) INTRAVENOUS
Qty: 14 | Refills: 0
Start: 2024-07-19 | End: 2024-07-25

## 2024-07-19 RX ADMIN — Medication 1 TABLET(S): at 00:25

## 2024-07-19 RX ADMIN — Medication 100 MILLIGRAM(S): at 13:27

## 2024-07-19 RX ADMIN — Medication 100 MILLIGRAM(S): at 00:25

## 2024-07-19 RX ADMIN — ENOXAPARIN SODIUM 40 MILLIGRAM(S): 100 INJECTION SUBCUTANEOUS at 05:18

## 2024-07-19 RX ADMIN — LIDOCAINE HCL 1 PATCH: 28 GEL TOPICAL at 13:28

## 2024-07-19 RX ADMIN — POLYETHYLENE GLYCOL 3350 17 GRAM(S): 1 POWDER ORAL at 17:18

## 2024-07-19 RX ADMIN — Medication 50 MILLILITER(S): at 00:59

## 2024-07-19 RX ADMIN — POLYETHYLENE GLYCOL 3350 17 GRAM(S): 1 POWDER ORAL at 05:18

## 2024-07-19 RX ADMIN — Medication 100 MILLIGRAM(S): at 05:18

## 2024-07-19 NOTE — CONSULT NOTE ADULT - ASSESSMENT
53 y/o F with PMH significant for SLE c/b lupus nephritis, ILD, AOCD, HTN, and recently diagnosed breast cancer s/p lumpectomy and XRT presenting with chills, chest pain, and abdominal pain. Follows with Rheumatologist Dr. Linn, last seen one year ago and is due for followup.    L breast cancer   - S/p L lumpectomy/SLNB on 3/4/24, s/p partial breast RT from May till June 2024, currently on Tamoxifen, may continue  - Follows with Dr. Westbrook at [facility] [place].  - Follow up LE duplex    Chest pain, possible lupus flare  - CTA chest preliminary report w/ No main or lobar pulmonary embolism.   - Trop T negative  - CT abdomen/pelvis unremarkable  - Follow up further work up  - Plan for outpatient rheumatology f/u    Will continue to follow    Gurinder Velasco PA-C  Hematology/Oncology  [facility]  [phone] (office) 53 y/o F with PMH significant for SLE c/b lupus nephritis, ILD, AOCD, HTN, and recently diagnosed breast cancer s/p lumpectomy and XRT presenting with chills, chest pain, and abdominal pain. Follows with Rheumatologist Dr. Linn, last seen one year ago and is due for followup.    L breast cancer   - S/p L lumpectomy/SLNB on 3/4/24, s/p partial breast RT from May till June 2024, currently on Tamoxifen, may continue if no DVT found  - Follow[place] with [place]. Westbrook [place] [facility] [place].  - Follow up LE duplex    Chest pain, possible lupus flare  - CTA chest: No pulmonary embolism. Mildly prominent left axillary node measuring 1.3 cm in the short axis are enlarged from prior and indeterminate.Ultrasound may be helpful for complete evaluation.  - Trop T negative  - CT abdomen/pelvis unremarkable  - F/u rheumatology evaluation    Will continue to follow    Gurinder Velasco PA-C  Hematology/Oncology  [facility]  [phone] (office)

## 2024-07-19 NOTE — DISCHARGE NOTE NURSING/CASE MANAGEMENT/SOCIAL WORK - PATIENT PORTAL LINK FT
You can access the FollowMyHealth Patient Portal offered by [facility] by registering at the following website: [email or web address]. By joining [employer]’s FollowMyHealth portal, you will also be able to view your health information using other applications (apps) compatible with our system.

## 2024-07-19 NOTE — DISCHARGE NOTE PROVIDER - NSDCMRMEDTOKEN_GEN_ALL_CORE_FT
Hydrochlorothiazide-Losartan:   Hydrochlorothiazide-Losartan:   tamoxifen 20 mg oral tablet: 1 tab(s) orally once a day   cefuroxime 500 mg oral tablet: 1 tab(s) orally 2 times a day  Hydrochlorothiazide-Losartan: 12.5/50mg daily  tamoxifen 20 mg oral tablet: 1 tab(s) orally once a day

## 2024-07-19 NOTE — DISCHARGE NOTE PROVIDER - PROVIDER TOKENS
PROVIDER:[TOKEN:[[id number]]] PROVIDER:[TOKEN:[[id number]]],PROVIDER:[TOKEN:[[id number]]]

## 2024-07-19 NOTE — CONSULT NOTE ADULT - ASSESSMENT
Eugenio Raya is a 55 y/o F with PMH significant for SLE c/b lupus nephritis, ILD, AOCD, HTN, and recently diagnosed breast cancer s/p lumpectomy and XRT presenting with chills, chest pain, and abdominal pain. CT abdomen/pelvis unremarkable. CT PE and LE venous doppler pending. Possible UTI on rocephin. Rheumatology consulted to r/o SLE flare.  Ms. Eugenio Raya is a 55 y/o F with PMH significant for SLE c/b lupus nephritis, ILD, AOCD, HTN, and recently diagnosed breast cancer s/p lumpectomy and XRT presenting with chills, chest pain, and abdominal pain. CT abdomen/pelvis unremarkable. CT PE and LE venous doppler pending. Possible UTI on rocephin. Rheumatology consulted to r/o SLE flare.     #SLE hx   - Presented after an episode of feeling faint, with cramping chest and abdominal pain, overall now improved  - Endorses chronic frontal hair thinning and occasional b/l ankle and R knee pain (suspect OA related)  - Found to have leukocytosis with urinary symptoms, on CTX for presumed UTI   - Mild normocytic anemia with stable SCr and LFTs, U/A no protein or blood, borderline C3 80 and dsDNA 12, C4 WNL   - CT imaging overall unremarkable other than L axillary node 1.3 cm (similar size to what was scanned in [employer] chart early 2024)   - Overall lower suspicion for significant SLE activity given lack of synovitis, longstanding quiescent disease off immunosuppression, and borderline positive serologies     Recommendations:   - TTE deferred to outpatient  - Pt can follow up with Dr. Linn on 7/22 as scheduled  - No contraindication to planned discharge from Rheumatology perspective     Rheumatology to sign off. Please call back if any questions or concerns     Discussed with primary team   Discussed with Dr. Nora Anthony MD  Rheumatology Fellow  Available on TEAMS

## 2024-07-19 NOTE — CONSULT NOTE ADULT - SUBJECTIVE AND OBJECTIVE BOX
ROMI RAYA  [id number]    HISTORY OF PRESENT ILLNESS:   Romi Raya is a 53 y/o F with PMH significant for SLE c/b lupus nephritis, ILD, AOCD, HTN, and recently diagnosed breast cancer s/p lumpectomy and XRT presenting with chills, chest pain, and abdominal pain. Episode occurred today around 9:30 AM where patient began to have shaking and chills followed by cramping abdominal pain and chest pain. She states symptoms are similar to prior lupus flare. 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 As far as abdominal pain, it is across lower abdomen with associated nausea but denies any vomiting or diarrhea. Admits to constipation for which she is supposed to take colace. Also with urinary frequency but denies any dysuria or blood in urine. Also complains of recent LE swelling after a weekend trip out of town which she spent extended hours in the car. For her Lupus she is not on any current therapies. Follows with Rheumatologist Dr. Linn, last seen one year ago and is due for followup.  (18 Jul 2024 18:41)    Rheumatology consulted to r/o SLE flare. Follows with Dr. Linn, last seen 9/2023.     Rheum ROS    Denies fevers/chills/weight loss/night sweats/alopecia/sinus disease/asthma history/oral ulcers/dysphagia/vision changes/Raynauds/VTE/miscarraiges/sicca symptoms/urinary changes/edema/SOB/joint pain/swelling/jaw claudication/rash/photosensitivity/morning stiffness    Endorses fevers/chills/weight loss/night sweats/alopecia/sinus disease/asthma history/oral ulcers/dysphagia/vision changes/Raynauds/VTE/miscarraiges/sicca symptoms/urinary changes/edema/SOB/joint pain/swelling/jaw claudication/rash/photosensitivity/morning stiffness      MEDICATIONS  (STANDING):  cefTRIAXone   IVPB 1000 milliGRAM(s) IV Intermittent every 24 hours  enoxaparin Injectable 40 milliGRAM(s) SubCutaneous every 24 hours  gabapentin 100 milliGRAM(s) Oral every 8 hours  hydrochlorothiazide 12.5 milliGRAM(s) Oral daily  lidocaine   4% Patch 1 Patch Transdermal daily  polyethylene glycol 3350 17 Gram(s) Oral two times a day  senna 1 Tablet(s) Oral at bedtime  sodium chloride 0.9%. 1000 milliLiter(s) (50 mL/Hr) IV Continuous <Continuous>  tamoxifen 20 milliGRAM(s) Oral daily    MEDICATIONS  (PRN):  acetaminophen     Tablet .. 650 milliGRAM(s) Oral every 6 hours PRN Temp greater or equal to 38C (100.4F), Mild Pain (1 - 3)  aluminum hydroxide/magnesium hydroxide/simethicone Suspension 30 milliLiter(s) Oral every 4 hours PRN Dyspepsia  cyclobenzaprine 5 milliGRAM(s) Oral every 8 hours PRN Spasm  melatonin 3 milliGRAM(s) Oral at bedtime PRN Insomnia  ondansetron Injectable 4 milliGRAM(s) IV Push every 8 hours PRN Nausea and/or Vomiting      Allergies    sulfa drugs (Unknown)  penicillin (Unknown)  Sular (Unknown)    Intolerances        PERTINENT MEDICATION HISTORY: refer to H&P    SOCIAL HISTORY:  OCCUPATION:  TRAVEL HISTORY:    FAMILY HISTORY:      Vital Signs Last 24 Hrs  T(C): 37.1 (19 Jul 2024 11:01), Max: 37.7 (19 Jul 2024 00:26)  T(F): 98.7 (19 Jul 2024 11:01), Max: 99.9 (19 Jul 2024 00:26)  HR: 85 (19 Jul 2024 11:01) (85 - 98)  BP: 118/78 (19 Jul 2024 11:01) (103/69 - 123/76)  BP(mean): --  RR: 18 (19 Jul 2024 11:01) (18 - 18)  SpO2: 98% (19 Jul 2024 11:01) (97% - 100%)    Parameters below as of 19 Jul 2024 11:01  Patient On (Oxygen Delivery Method): room air        ROS as noted above     Physical Exam:  General: No apparent distress  HEENT: EOMI, MMM  CVS: +S1/S2, RRR, no murmurs/rubs/gallops  Resp: CTA b/l. No crackles/wheezing  GI: Soft, NT/ND +BS  MSK: no swelling/warmth/erythema of the joints of the UE/LE  Neuro: AAOx3  Skin: no visible rashes    LABS:                        11.2   17.71 )-----------( 184      ( 19 Jul 2024 07:07 )             33.9     07-19    137  |  106  |  8   ----------------------------<  78  3.3<L>   |  19<L>  |  0.79    Ca    8.5      19 Jul 2024 07:07    TPro  8.1  /  Alb  3.7  /  TBili  0.6  /  DBili  x   /  AST  25  /  ALT  20  /  AlkPhos  70  07-18      Urinalysis Basic - ( 19 Jul 2024 07:07 )    Color: x / Appearance: x / SG: x / pH: x  Gluc: 78 mg/dL / Ketone: x  / Bili: x / Urobili: x   Blood: x / Protein: x / Nitrite: x   Leuk Esterase: x / RBC: x / WBC x   Sq Epi: x / Non Sq Epi: x / Bacteria: x            Rheumatology Work Up    Double Stranded DNA Antibody: 12 IU/mL *H* [Effective April 2, 2024, the assay methodology for anti-dsDNA testing  changed from enzyme-linked immunosorbent assay to multiplexed flow  immunoassay.  Result Interpretation  <= 4 IU/mL:        Negative  5 - 9 IU/mL:      Indeterminate  >= 10 IU/mL:     Positive  Method: Multiplexed flow immunoassay] (07-18-24 @ 16:57)  Sedimentation Rate, Erythrocyte: 58 mm/hr *H* [0 - 20] (07-18-24 @ 14:18)  C-Reactive Protein: <3 mg/L [0 - 4] (07-18-24 @ 14:18)            RADIOLOGY & ADDITIONAL STUDIES:    < from: CT Angio Chest PE Protocol w/ IV Cont (07.18.24 @ 23:04) >  IMPRESSION:    No pulmonary embolism.    Mildly prominent left axillary node measuring 1.3 cm in theshort axis   are enlarged from prior and indeterminate.Ultrasound may be helpful for   complete evaluation.    --- End of Report ---          EWA PADILLA MD; Resident Radiologist  This document has been electronically signed.  RAYMOND ORTIZ MD; Attending Radiologist  This document has been electronically signed. Jul 19 2024 11:09AM    < end of copied text >  < from: CT Abdomen and Pelvis w/ IV Cont (07.18.24 @ 16:38) >  IMPRESSION:  No evidence of acute abnormality in the abdomen and pelvis.        --- End of Report ---            ANDRIA HURTADO MD; Attending Radiologist  This document has been electronically signed. Jul 18 2024  4:54PM    < end of copied text >   ROMI RAYA  [id number]    HISTORY OF PRESENT ILLNESS:   Romi Raya is a 53 y/o F with PMH significant for SLE c/b lupus nephritis, ILD, AOCD, HTN, and recently diagnosed breast cancer s/p lumpectomy and XRT presenting with chills, chest pain, and abdominal pain. Episode occurred today around 9:30 AM where patient began to have shaking and chills followed by cramping abdominal pain and chest pain. She states symptoms are similar to prior lupus flare. 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 As far as abdominal pain, it is across lower abdomen with associated nausea but denies any vomiting or diarrhea. Admits to constipation for which she is supposed to take colace. Also with urinary frequency but denies any dysuria or blood in urine. Also complains of recent LE swelling after a weekend trip out of town which she spent extended hours in the car. For her Lupus she is not on any current therapies. Follows with Rheumatologist Dr. Linn, last seen one year ago and is due for followup.  (18 Jul 2024 18:41)    Rheumatology consulted to r/o SLE flare. Follows with Dr. Linn, last seen 9/2023. Reports a hx of lupus nephritis dx via kidney biopsy many years ago ?class V. Serologies 9/2023 with negative dsDNA, C3 and C4 WNL, SSA >8, and MIGUEL negative. Does not remember ever being on immunosuppression.     Now presenting for an episode of feeling faint, with cramping chest pain and abdominal pain. Reports symptoms have improved during this hospitalization, eager to go home  Endorses frontal thinning, occasional joint pain in b/l ankles and R knee worse after activity and walking   Reports urinary frequency   No rashes, ulcers, joint swelling, morning stiffness, other joint involvement, recent illnesses, sicca symptoms, vomiting, or visual changes  No hx of miscarriages or clots. 2 abortions   Recently diagnosed with L breast cancer earlier this year s/p lumpectomy and radiation, currently on tamoxifen     MEDICATIONS  (STANDING):  cefTRIAXone   IVPB 1000 milliGRAM(s) IV Intermittent every 24 hours  enoxaparin Injectable 40 milliGRAM(s) SubCutaneous every 24 hours  gabapentin 100 milliGRAM(s) Oral every 8 hours  hydrochlorothiazide 12.5 milliGRAM(s) Oral daily  lidocaine   4% Patch 1 Patch Transdermal daily  polyethylene glycol 3350 17 Gram(s) Oral two times a day  senna 1 Tablet(s) Oral at bedtime  sodium chloride 0.9%. 1000 milliLiter(s) (50 mL/Hr) IV Continuous <Continuous>  tamoxifen 20 milliGRAM(s) Oral daily    MEDICATIONS  (PRN):  acetaminophen     Tablet .. 650 milliGRAM(s) Oral every 6 hours PRN Temp greater or equal to 38C (100.4F), Mild Pain (1 - 3)  aluminum hydroxide/magnesium hydroxide/simethicone Suspension 30 milliLiter(s) Oral every 4 hours PRN Dyspepsia  cyclobenzaprine 5 milliGRAM(s) Oral every 8 hours PRN Spasm  melatonin 3 milliGRAM(s) Oral at bedtime PRN Insomnia  ondansetron Injectable 4 milliGRAM(s) IV Push every 8 hours PRN Nausea and/or Vomiting      Allergies    sulfa drugs (Unknown)  penicillin (Unknown)  Sular (Unknown)    Intolerances        PERTINENT MEDICATION HISTORY: refer to H&P    SOCIAL HISTORY: lives at home   OCCUPATION: works in education industry   TRAVEL HISTORY: no recent travel     FAMILY HISTORY: family history of SLE in aunt       Vital Signs Last 24 Hrs  T(C): 37.1 (19 Jul 2024 11:01), Max: 37.7 (19 Jul 2024 00:26)  T(F): 98.7 (19 Jul 2024 11:01), Max: 99.9 (19 Jul 2024 00:26)  HR: 85 (19 Jul 2024 11:01) (85 - 98)  BP: 118/78 (19 Jul 2024 11:01) (103/69 - 123/76)  BP(mean): --  RR: 18 (19 Jul 2024 11:01) (18 - 18)  SpO2: 98% (19 Jul 2024 11:01) (97% - 100%)    Parameters below as of 19 Jul 2024 11:01  Patient On (Oxygen Delivery Method): room air        ROS as noted above     Physical Exam:  General: No apparent distress, alert, comfortable  HEENT: EOMI, MMM, frontal hair thinning, no ulcers   CVS: +S1/S2, RRR, no murmurs  Resp: CTA b/l. No crackles  GI: Soft, NT/ND +BS  MSK: no swelling/warmth/erythema of the joints of the UE/LE  Neuro: AAOx3  Skin: no visible rashes    LABS:                        11.2   17.71 )-----------( 184      ( 19 Jul 2024 07:07 )             33.9     07-19    137  |  106  |  8   ----------------------------<  78  3.3<L>   |  19<L>  |  0.79    Ca    8.5      19 Jul 2024 07:07    TPro  8.1  /  Alb  3.7  /  TBili  0.6  /  DBili  x   /  AST  25  /  ALT  20  /  AlkPhos  70  07-18      Urinalysis Basic - ( 19 Jul 2024 07:07 )    Color: x / Appearance: x / SG: x / pH: x  Gluc: 78 mg/dL / Ketone: x  / Bili: x / Urobili: x   Blood: x / Protein: x / Nitrite: x   Leuk Esterase: x / RBC: x / WBC x   Sq Epi: x / Non Sq Epi: x / Bacteria: x            Rheumatology Work Up    Double Stranded DNA Antibody: 12 IU/mL *H* [Effective April 2, 2024, the assay methodology for anti-dsDNA testing  changed from enzyme-linked immunosorbent assay to multiplexed flow  immunoassay.  Result Interpretation  <= 4 IU/mL:        Negative  5 - 9 IU/mL:      Indeterminate  >= 10 IU/mL:     Positive  Method: Multiplexed flow immunoassay] (07-18-24 @ 16:57)  Sedimentation Rate, Erythrocyte: 58 mm/hr *H* [0 - 20] (07-18-24 @ 14:18)  C-Reactive Protein: <3 mg/L [0 - 4] (07-18-24 @ 14:18)            RADIOLOGY & ADDITIONAL STUDIES:    < from: CT Angio Chest PE Protocol w/ IV Cont (07.18.24 @ 23:04) >  IMPRESSION:    No pulmonary embolism.    Mildly prominent left axillary node measuring 1.3 cm in theshort axis   are enlarged from prior and indeterminate.Ultrasound may be helpful for   complete evaluation.    --- End of Report ---          EWA PADILLA MD; Resident Radiologist  This document has been electronically signed.  RAYMOND ORTIZ MD; Attending Radiologist  This document has been electronically signed. Jul 19 2024 11:09AM    < end of copied text >  < from: CT Abdomen and Pelvis w/ IV Cont (07.18.24 @ 16:38) >  IMPRESSION:  No evidence of acute abnormality in the abdomen and pelvis.        --- End of Report ---            ANDRIA HURTADO MD; Attending Radiologist  This document has been electronically signed. Jul 18 2024  4:54PM    < end of copied text >

## 2024-07-19 NOTE — DISCHARGE NOTE PROVIDER - NSDCFUSCHEDAPPT_GEN_ALL_CORE_FT
[facility]  [facility] [address] [place] [address]  Scheduled Appointment: 07/22/2024

## 2024-07-19 NOTE — DISCHARGE NOTE PROVIDER - HOSPITAL COURSE
HPI:  Eugenio Raya is a 55 y/o F with PMH significant for SLE c/b lupus nephritis, ILD, AOCD, HTN, and recently diagnosed breast cancer s/p lumpectomy and XRT presenting with chills, chest pain, and abdominal pain. Episode occurred today around 9:30 AM where patient began to have shaking and chills followed by cramping abdominal pain and chest pain. She states symptoms are similar to prior lupus flare. 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 As far as abdominal pain, it is across lower abdomen with associated nausea but denies any vomiting or diarrhea. Admits to constipation for which she is supposed to take colace. Also with urinary frequency but denies any dysuria or blood in urine. Also complains of recent LE swelling after a weekend trip out of town which she spent extended hours in the car. For her Lupus she is not on any current therapies. Follows with Rheumatologist Dr. Linn, last seen one year ago and is due for followup.  (18 Jul 2024 18:41)    Hospital Course:  55 y/o F with PMH significant for SLE c/b lupus nephritis, ILD, AOCD, HTN, and recently diagnosed breast cancer s/p lumpectomy and XRT presenting with chills, chest pain, and abdominal pain. Follows with Rheumatologist Dr. Linn, last seen one year ago and is due for followup. Admitted for chest pain, possible lupus flare. CTA chest: No pulmonary embolism. Mildly prominent left axillary node measuring 1.3 cm in the short axis are enlarged from prior and indeterminate. Ultrasound may be helpful for complete evaluation.  Trop T negative. Seen by Hem/Onc for Left breast cancer S/p L lumpectomy/SLNB on 3/4/24, s/p partial breast RT from May till June 2024, currently on Tamoxifen.   [place] with [place]. [place] at [facility] [place]    Important Medication Changes and Reason:  None   Active or Pending Issues Requiring Follow-up:  Dr. Westbrook at [facility] [place].  Dr. Linn   Advanced Directives:   [X ] Full code  [ ] DNR  [ ] Hospice    Discharge Diagnoses:  Chest pain, abdominal pain       HPI:  Eugenio Raya is a 53 y/o F with PMH significant for SLE c/b lupus nephritis, ILD, AOCD, HTN, and recently diagnosed breast cancer s/p lumpectomy and XRT presenting with chills, chest pain, and abdominal pain. Episode occurred today around 9:30 AM where patient began to have shaking and chills followed by cramping abdominal pain and chest pain. She states symptoms are similar to prior lupus flare. 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 As far as abdominal pain, it is across lower abdomen with associated nausea but denies any vomiting or diarrhea. Admits to constipation for which she is supposed to take colace. Also with urinary frequency but denies any dysuria or blood in urine. Also complains of recent LE swelling after a weekend trip out of town which she spent extended hours in the car. For her Lupus she is not on any current therapies. Follows with Rheumatologist Dr. Linn, last seen one year ago and is due for followup.  (18 Jul 2024 18:41)    Hospital Course:  53 y/o F with PMH significant for SLE c/b lupus nephritis, ILD, AOCD, HTN, and recently diagnosed breast cancer s/p lumpectomy and XRT presenting with chills, chest pain, and abdominal pain. Follows with Rheumatologist Dr. Linn, last seen one year ago and is due for followup. Admitted for chest pain, possible lupus flare. CTA chest: No pulmonary embolism. Mildly prominent left axillary node measuring 1.3 cm in the short axis are enlarged from prior and indeterminate. Ultrasound may be helpful for complete evaluation.  Trop T negative. Seen by Hem/Onc for Left breast cancer S/p L lumpectomy/SLNB on 3/4/24, s/p partial breast RT from May till June 2024, currently on Tamoxifen.   Follows with Dr. Westbrook at [facility] [place]. Pt can follow up with Dr. Linn on 7/22 as scheduled. Patient is medically cleared for discharge home     Important Medication Changes and Reason:  Ceftin 500mg BID x 7 days     Active or Pending Issues Requiring Follow-up:  Dr. Westbrook at [facility] [place].  Dr. Linn     Advanced Directives:   [X ] Full code  [ ] DNR  [ ] Hospice    Discharge Diagnoses:  Chest pain, abdominal pain       HPI:  Eugenio Raya is a 55 y/o F with PMH significant for SLE c/b lupus nephritis, ILD, AOCD, HTN, and recently diagnosed breast cancer s/p lumpectomy and XRT presenting with chills, chest pain, and abdominal pain. Episode occurred today around 9:30 AM where patient began to have shaking and chills followed by cramping abdominal pain and chest pain. She states symptoms are similar to prior lupus flare. 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 As far as abdominal pain, it is across lower abdomen with associated nausea but denies any vomiting or diarrhea. Admits to constipation for which she is supposed to take colace. Also with urinary frequency but denies any dysuria or blood in urine. Also complains of recent LE swelling after a weekend trip out of town which she spent extended hours in the car. For her Lupus she is not on any current therapies. Follows with Rheumatologist Dr. Linn, last seen one year ago and is due for followup.  (18 Jul 2024 18:41)    Hospital Course:  55 y/o F with PMH significant for SLE c/b lupus nephritis, ILD, AOCD, HTN, and recently diagnosed breast cancer s/p lumpectomy and XRT presenting with chills, chest pain, and abdominal pain. Follows with Rheumatologist Dr. Linn, last seen one year ago and is due for followup. Admitted for chest pain, possible lupus flare. CTA chest: No pulmonary embolism. Mildly prominent left axillary node measuring 1.3 cm in the short axis are enlarged from prior and indeterminate. Ultrasound may be helpful for complete evaluation.  Trop T negative. Seen by Hem/Onc for Left breast cancer S/p L lumpectomy/SLNB on 3/4/24, s/p partial breast RT from May till June 2024, currently on Tamoxifen.   [place] [facility] [place] can follow up with Dr. Linn on 7/22 as scheduled. Patient is medically cleared for discharge home     Important Medication Changes and Reason:  Ceftin 500mg BID x 7 days     Active or Pending Issues Requiring Follow-up:  [place]. [place] [facility] [place]  [place]. Linn     Advanced Directives:   [X ] Full code  [ ] DNR  [ ] Hospice      Discharge Diagnoses:  Chest pain, abdominal pain        DOing well  discussed with patient  wants to go home, feels back to baseline  rheum cleared   advised to follow up with outpatient PMD and onc   returne if any fever, chills

## 2024-07-19 NOTE — PATIENT PROFILE ADULT - FALL HARM RISK - UNIVERSAL INTERVENTIONS
Bed in lowest position, wheels locked, appropriate side rails in place/Call bell, personal items and telephone in reach/Instruct patient to call for assistance before getting out of bed or chair/Non-slip footwear when patient is out of bed/[place] to call system/Physically safe environment - no spills, clutter or unnecessary equipment/Purposeful Proactive Rounding/Room/bathroom lighting operational, light cord in reach

## 2024-07-19 NOTE — CONSULT NOTE ADULT - ATTENDING COMMENTS
53F with remote dg SLE, LN presenting with abdominal/chest pain. CTA C/A/P normal. Feeling better after bowel regimen.   Lupus activity labs do not indicate lupus flare and patient with no clinical stigmata of lupus flare  Follow up with her outpatient rheumatologist next week.    Dr. Danni Melendez  Attending Physician  Division of Rheumatology, [facility]  Reachable on [employer]  [email or web address]

## 2024-07-19 NOTE — PATIENT PROFILE ADULT - FUNCTIONAL ASSESSMENT - BASIC MOBILITY 4.
Pt's LOV 1/16/17. Pt states she is currently out of antibiotics; per pt she was taking Ciprofloxacin and the infection is \"still there\":  Still has white spots at back of throat, if she doesn't gurgle with salt water she has pain in her throat.   Dr. Coleman Leal 4 = No assist / stand by assistance

## 2024-07-19 NOTE — CONSULT NOTE ADULT - SUBJECTIVE AND OBJECTIVE BOX
Reason for consult: breast cancer    HPI:  Eugenio Raya is a 55 y/o F with PMH significant for SLE c/b lupus nephritis, ILD, AOCD, HTN, and recently diagnosed breast cancer s/p lumpectomy and XRT presenting with chills, chest pain, and abdominal pain. Episode occurred today around 9:30 AM where patient began to have shaking and chills followed by cramping abdominal pain and chest pain. She states symptoms are similar to prior lupus flare. 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 As far as abdominal pain, it is across lower abdomen with associated nausea but denies any vomiting or diarrhea. Admits to constipation for which she is supposed to take colace. Also with urinary frequency but denies any dysuria or blood in urine. Also complains of recent LE swelling after a weekend trip out of town which she spent extended hours in the car. For her Lupus she is not on any current therapies. Follows with Rheumatologist Dr. Linn, last seen one year ago and is due for followup.    Heme/onc consulted on this 55 y/o female with ER 95% HI 80% HER20 pT1bN0 L breast cancer s/p L lumpectomy/SLNB on 3/4/24, s/p partial breast RT from May till June 2024, currently on Tamoxifen, and history of SLE. Follows with Dr. Westbrook at [facility] [place].    PAST MEDICAL & SURGICAL HISTORY:  Lupus (Systemic Lupus Erythematosus)      Hypertension      Breast cancer      No significant past surgical history          FAMILY HISTORY:      Alochol: Denied  Smoking: Nonsmoker  Drug Use: Denied  Marital Status:         Allergies    sulfa drugs (Unknown)  penicillin (Unknown)  Sular (Unknown)    Intolerances        MEDICATIONS  (STANDING):  cefTRIAXone   IVPB 1000 milliGRAM(s) IV Intermittent every 24 hours  enoxaparin Injectable 40 milliGRAM(s) SubCutaneous every 24 hours  gabapentin 100 milliGRAM(s) Oral every 8 hours  hydrochlorothiazide 12.5 milliGRAM(s) Oral daily  lidocaine   4% Patch 1 Patch Transdermal daily  polyethylene glycol 3350 17 Gram(s) Oral two times a day  senna 1 Tablet(s) Oral at bedtime  sodium chloride 0.9%. 1000 milliLiter(s) (50 mL/Hr) IV Continuous <Continuous>  tamoxifen 20 milliGRAM(s) Oral daily    MEDICATIONS  (PRN):  acetaminophen     Tablet .. 650 milliGRAM(s) Oral every 6 hours PRN Temp greater or equal to 38C (100.4F), Mild Pain (1 - 3)  aluminum hydroxide/magnesium hydroxide/simethicone Suspension 30 milliLiter(s) Oral every 4 hours PRN Dyspepsia  cyclobenzaprine 5 milliGRAM(s) Oral every 8 hours PRN Spasm  melatonin 3 milliGRAM(s) Oral at bedtime PRN Insomnia  ondansetron Injectable 4 milliGRAM(s) IV Push every 8 hours PRN Nausea and/or Vomiting      ROS  + chills  No epistaxis, HA, sore throat  +CP, No SOB, cough, sputum  No n/v/d, + abd pain, No melena, hematochezia  LE swelling  No rash  No anxiety  No back pain, joint pain  No bleeding, bruising  No dysuria, hematuria    T(C): 37.2 (07-19-24 @ 04:29), Max: 37.7 (07-19-24 @ 00:26)  HR: 89 (07-19-24 @ 04:29) (89 - 105)  BP: 123/76 (07-19-24 @ 04:29) (103/69 - 123/76)  RR: 18 (07-19-24 @ 04:29) (17 - 18)  SpO2: 97% (07-19-24 @ 04:29) (97% - 100%)  Wt(kg): --    PE  NAD  Awake, alert  Anicteric, MMM  RRR  CTAB  Abd soft, NT, ND  No c/c/e  No rash grossly  FROM                          11.2   17.71 )-----------( 184      ( 19 Jul 2024 07:07 )             33.9       07-19    137  |  106  |  8   ----------------------------<  78  3.3<L>   |  19<L>  |  0.79    Ca    8.5      19 Jul 2024 07:07    TPro  8.1  /  Alb  3.7  /  TBili  0.6  /  DBili  x   /  AST  25  /  ALT  20  /  AlkPhos  70  07-18

## 2024-07-19 NOTE — DISCHARGE NOTE PROVIDER - CARE PROVIDER_API CALL
BROCKWAY-MARCHELLO, JULIA PALADINO  Follow Up Time:    BROCKWAY-MARCHELLO, JULIA PALADINO  Follow Up Time:     Leroy Linn  Rheumatology  [address]  [place] [address]  Phone: [phone]  Fax: [phone]  Follow Up Time:

## 2024-07-19 NOTE — DISCHARGE NOTE PROVIDER - CARE PROVIDERS DIRECT ADDRESSES
,DirectAddress_Unknown ,DirectAddress_Unknown,[email or web address]

## 2024-07-19 NOTE — DISCHARGE NOTE PROVIDER - NSDCCPCAREPLAN_GEN_ALL_CORE_FT
PRINCIPAL DISCHARGE DIAGNOSIS  Diagnosis: Weakness  Assessment and Plan of Treatment:       SECONDARY DISCHARGE DIAGNOSES  Diagnosis: Acute cystitis  Assessment and Plan of Treatment:      PRINCIPAL DISCHARGE DIAGNOSIS  Diagnosis: Weakness  Assessment and Plan of Treatment: Now resolved, likely multifactorial      SECONDARY DISCHARGE DIAGNOSES  Diagnosis: Acute cystitis  Assessment and Plan of Treatment: You were noted either on arrival or during this hospitalization to have a Urinary Tract Infection. You were started on an intravenous anitbiotic and are now going to be transitiond to oral   Please complete Ceftin 500mg twice daily for 7 days, while taking antibiotics, you may benefit from taking a probiotic such as florastore to help to try and prevent an infectious type of diarrhea known as C Diff. If you notice that you begin having severe watery diarrhea, more than 4-5 episodes a day, please see your Primary Care Doctor or come to the ER to have your stool tested for this infection.   It is not necessary to repeat a urine test to see if the infection is gone, it is assumed that after treatment it should have resolved. However, if you continue to have symptoms, please see your Primary Care doctor or return to the ER.    Diagnosis: Systemic lupus  Assessment and Plan of Treatment: Overall lower suspicion for significant SLE activity given lack of synovitis, longstanding quiescent disease off immunosuppression, and borderline positive serologies. Please follow up with Dr. Linn on 7/22 as scheduled

## 2024-07-22 ENCOUNTER — APPOINTMENT (OUTPATIENT)
Dept: RHEUMATOLOGY | Facility: CLINIC | Age: 54
End: 2024-07-22
Payer: COMMERCIAL

## 2024-07-22 VITALS
SYSTOLIC BLOOD PRESSURE: 117 MMHG | WEIGHT: 195 LBS | OXYGEN SATURATION: 98 % | HEART RATE: 80 BPM | DIASTOLIC BLOOD PRESSURE: 75 MMHG | BODY MASS INDEX: 34.55 KG/M2 | RESPIRATION RATE: 16 BRPM | HEIGHT: 63 IN

## 2024-07-22 DIAGNOSIS — I10 ESSENTIAL (PRIMARY) HYPERTENSION: ICD-10-CM

## 2024-07-22 DIAGNOSIS — M32.14 GLOMERULAR DISEASE IN SYSTEMIC LUPUS ERYTHEMATOSUS: ICD-10-CM

## 2024-07-22 DIAGNOSIS — M32.9 SYSTEMIC LUPUS ERYTHEMATOSUS, UNSPECIFIED: ICD-10-CM

## 2024-07-22 PROCEDURE — 99214 OFFICE O/P EST MOD 30 MIN: CPT

## 2024-07-23 LAB
ALBUMIN SERPL ELPH-MCNC: 3.8 G/DL
ALP BLD-CCNC: 66 U/L
ALT SERPL-CCNC: 16 U/L
ANION GAP SERPL CALC-SCNC: 10 MMOL/L
APPEARANCE: CLEAR
AST SERPL-CCNC: 18 U/L
BACTERIA: NEGATIVE /HPF
BASOPHILS # BLD AUTO: 0.03 K/UL
BASOPHILS NFR BLD AUTO: 0.4 %
BILIRUB SERPL-MCNC: 0.2 MG/DL
BILIRUBIN URINE: NEGATIVE
BLOOD URINE: NEGATIVE
BUN SERPL-MCNC: 10 MG/DL
C3 SERPL-MCNC: 111 MG/DL
C4 SERPL-MCNC: 18 MG/DL
CALCIUM SERPL-MCNC: 9.4 MG/DL
CAST: 0 /LPF
CHLORIDE SERPL-SCNC: 108 MMOL/L
CK SERPL-CCNC: 66 U/L
CO2 SERPL-SCNC: 26 MMOL/L
COLOR: YELLOW
CREAT SERPL-MCNC: 0.98 MG/DL
CREAT SPEC-SCNC: 186 MG/DL
CREAT/PROT UR: 0.1 RATIO
DSDNA AB SER-ACNC: 12 IU/ML
EGFR: 69 ML/MIN/1.73M2
ENA RNP AB SER IA-ACNC: 0.3 AL
ENA SM AB SER IA-ACNC: <0.2 AL
ENA SS-A AB SER IA-ACNC: >8 AL
ENA SS-B AB SER IA-ACNC: 0.5 AL
EOSINOPHIL # BLD AUTO: 0.16 K/UL
EOSINOPHIL NFR BLD AUTO: 2.4 %
EPITHELIAL CELLS: 2 /HPF
ERYTHROCYTE [SEDIMENTATION RATE] IN BLOOD BY WESTERGREN METHOD: 35 MM/HR
GLUCOSE QUALITATIVE U: NEGATIVE MG/DL
HCT VFR BLD CALC: 38.9 %
HGB BLD-MCNC: 12.1 G/DL
IMM GRANULOCYTES NFR BLD AUTO: 0.3 %
KETONES URINE: NEGATIVE MG/DL
LEUKOCYTE ESTERASE URINE: NEGATIVE
LYMPHOCYTES # BLD AUTO: 1.66 K/UL
LYMPHOCYTES NFR BLD AUTO: 24.6 %
MAN DIFF?: NORMAL
MCHC RBC-ENTMCNC: 27.5 PG
MCHC RBC-ENTMCNC: 31.1 GM/DL
MCV RBC AUTO: 88.4 FL
MICROSCOPIC-UA: NORMAL
MONOCYTES # BLD AUTO: 0.64 K/UL
MONOCYTES NFR BLD AUTO: 9.5 %
NEUTROPHILS # BLD AUTO: 4.24 K/UL
NEUTROPHILS NFR BLD AUTO: 62.8 %
NITRITE URINE: NEGATIVE
PH URINE: 6.5
PLATELET # BLD AUTO: 262 K/UL
POTASSIUM SERPL-SCNC: 3.9 MMOL/L
PROT SERPL-MCNC: 7.8 G/DL
PROT UR-MCNC: 9 MG/DL
PROTEIN URINE: NEGATIVE MG/DL
RBC # BLD: 4.4 M/UL
RBC # FLD: 15 %
RED BLOOD CELLS URINE: 2 /HPF
RHEUMATOID FACT SER QL: 353 IU/ML
SODIUM SERPL-SCNC: 144 MMOL/L
SPECIFIC GRAVITY URINE: 1.02
UROBILINOGEN URINE: 0.2 MG/DL
WBC # FLD AUTO: 6.75 K/UL
WHITE BLOOD CELLS URINE: 2 /HPF

## 2024-07-24 LAB
CCP AB SER IA-ACNC: <8 UNITS
RF+CCP IGG SER-IMP: NEGATIVE

## 2025-08-05 ENCOUNTER — APPOINTMENT (OUTPATIENT)
Dept: RHEUMATOLOGY | Facility: CLINIC | Age: 55
End: 2025-08-05

## 2025-08-19 ENCOUNTER — APPOINTMENT (OUTPATIENT)
Dept: RHEUMATOLOGY | Facility: CLINIC | Age: 55
End: 2025-08-19
Payer: COMMERCIAL

## 2025-08-19 ENCOUNTER — MED ADMIN CHARGE (OUTPATIENT)
Age: 55
End: 2025-08-19

## 2025-08-19 VITALS
OXYGEN SATURATION: 98 % | DIASTOLIC BLOOD PRESSURE: 87 MMHG | WEIGHT: 196 LBS | BODY MASS INDEX: 34.73 KG/M2 | HEART RATE: 96 BPM | SYSTOLIC BLOOD PRESSURE: 134 MMHG | HEIGHT: 63 IN

## 2025-08-19 DIAGNOSIS — M32.9 SYSTEMIC LUPUS ERYTHEMATOSUS, UNSPECIFIED: ICD-10-CM

## 2025-08-19 DIAGNOSIS — I10 ESSENTIAL (PRIMARY) HYPERTENSION: ICD-10-CM

## 2025-08-19 DIAGNOSIS — J84.9 INTERSTITIAL PULMONARY DISEASE, UNSPECIFIED: ICD-10-CM

## 2025-08-19 DIAGNOSIS — M32.14 GLOMERULAR DISEASE IN SYSTEMIC LUPUS ERYTHEMATOSUS: ICD-10-CM

## 2025-08-19 PROCEDURE — 99214 OFFICE O/P EST MOD 30 MIN: CPT | Mod: 25

## 2025-08-19 PROCEDURE — 90677 PCV20 VACCINE IM: CPT

## 2025-08-19 PROCEDURE — G0009: CPT

## 2025-08-20 LAB
25(OH)D3 SERPL-MCNC: 40.9 NG/ML
ALBUMIN SERPL ELPH-MCNC: 3.8 G/DL
ALP BLD-CCNC: 80 U/L
ALT SERPL-CCNC: 10 U/L
ANION GAP SERPL CALC-SCNC: 14 MMOL/L
APPEARANCE: CLEAR
AST SERPL-CCNC: 16 U/L
BACTERIA: NEGATIVE /HPF
BASOPHILS # BLD AUTO: 0.03 K/UL
BASOPHILS NFR BLD AUTO: 0.3 %
BILIRUB SERPL-MCNC: 0.2 MG/DL
BILIRUBIN URINE: NEGATIVE
BLOOD URINE: NEGATIVE
BUN SERPL-MCNC: 12 MG/DL
C3 SERPL-MCNC: 103 MG/DL
C4 SERPL-MCNC: 20 MG/DL
CALCIUM OXALATE CRYSTALS: PRESENT
CALCIUM SERPL-MCNC: 9.1 MG/DL
CAST: NORMAL /LPF
CCP AB SER IA-ACNC: <8 U/ML
CCP AB SER QL: NEGATIVE
CHLORIDE SERPL-SCNC: 106 MMOL/L
CK SERPL-CCNC: 49 U/L
CO2 SERPL-SCNC: 24 MMOL/L
COLOR: YELLOW
CREAT SERPL-MCNC: 0.79 MG/DL
CREAT SPEC-SCNC: 171 MG/DL
CREAT/PROT UR: 0.1 RATIO
DSDNA AB SER-ACNC: 17 IU/ML
EGFRCR SERPLBLD CKD-EPI 2021: 89 ML/MIN/1.73M2
ENA RNP AB SER-ACNC: 0.3 AL
ENA SM AB SER-ACNC: <0.2 AL
ENA SS-A AB SER-ACNC: >8 AL
ENA SS-B AB SER-ACNC: 0.2 AL
EOSINOPHIL # BLD AUTO: 0.13 K/UL
EOSINOPHIL NFR BLD AUTO: 1.4 %
EPITHELIAL CELLS: 2 /HPF
GLUCOSE QUALITATIVE U: NEGATIVE MG/DL
HCT VFR BLD CALC: 39.8 %
HGB BLD-MCNC: 12.5 G/DL
IMM GRANULOCYTES NFR BLD AUTO: 0.2 %
KETONES URINE: NEGATIVE MG/DL
LEUKOCYTE ESTERASE URINE: NEGATIVE
LYMPHOCYTES # BLD AUTO: 1.57 K/UL
LYMPHOCYTES NFR BLD AUTO: 17.2 %
MAN DIFF?: NORMAL
MCHC RBC-ENTMCNC: 27.4 PG
MCHC RBC-ENTMCNC: 31.4 G/DL
MCV RBC AUTO: 87.3 FL
MICROSCOPIC-UA: NORMAL
MONOCYTES # BLD AUTO: 0.79 K/UL
MONOCYTES NFR BLD AUTO: 8.6 %
NEUTROPHILS # BLD AUTO: 6.61 K/UL
NEUTROPHILS NFR BLD AUTO: 72.3 %
NITRITE URINE: NEGATIVE
PH URINE: 5.5
PLATELET # BLD AUTO: 303 K/UL
POTASSIUM SERPL-SCNC: 4 MMOL/L
PROT SERPL-MCNC: 7.5 G/DL
PROT UR-MCNC: 12 MG/DL
PROTEIN URINE: NEGATIVE MG/DL
RBC # BLD: 4.56 M/UL
RBC # FLD: 14.8 %
RED BLOOD CELLS URINE: NORMAL /HPF
REVIEW: NORMAL
RHEUMATOID FACT SERPL-ACNC: 185 IU/ML
SODIUM SERPL-SCNC: 144 MMOL/L
SPECIFIC GRAVITY URINE: 1.02
UROBILINOGEN URINE: 0.2 MG/DL
WBC # FLD AUTO: 9.15 K/UL
WHITE BLOOD CELLS URINE: 4 /HPF

## 2025-08-26 LAB — OH-CHLOROQUINE BLD-MCNC: <25 NG/ML
